# Patient Record
Sex: MALE | Race: WHITE | NOT HISPANIC OR LATINO | Employment: FULL TIME | URBAN - NONMETROPOLITAN AREA
[De-identification: names, ages, dates, MRNs, and addresses within clinical notes are randomized per-mention and may not be internally consistent; named-entity substitution may affect disease eponyms.]

---

## 2017-01-04 DIAGNOSIS — R60.0 LOCALIZED EDEMA: Primary | ICD-10-CM

## 2017-05-23 ENCOUNTER — OFFICE VISIT (OUTPATIENT)
Dept: CARDIAC SURGERY | Facility: CLINIC | Age: 32
End: 2017-05-23

## 2017-05-23 VITALS
HEART RATE: 73 BPM | HEIGHT: 76 IN | OXYGEN SATURATION: 99 % | SYSTOLIC BLOOD PRESSURE: 161 MMHG | DIASTOLIC BLOOD PRESSURE: 94 MMHG | BODY MASS INDEX: 29.83 KG/M2 | WEIGHT: 245 LBS | TEMPERATURE: 98.2 F

## 2017-05-23 DIAGNOSIS — I83.90 VARICOSE VEINS OF LOWER EXTREMITY: Primary | ICD-10-CM

## 2017-05-23 DIAGNOSIS — I87.2 PERIPHERAL VENOUS INSUFFICIENCY: ICD-10-CM

## 2017-05-23 PROCEDURE — 99213 OFFICE O/P EST LOW 20 MIN: CPT | Performed by: NURSE PRACTITIONER

## 2017-05-23 RX ORDER — ESCITALOPRAM OXALATE 10 MG/1
10 TABLET ORAL DAILY
COMMUNITY
End: 2022-02-22

## 2017-06-08 NOTE — PROGRESS NOTES
Subjective   Patient ID: Tenzin Eastman is a 32 y.o. male is here today for follow-up VARICOSE VEINS, INSUFFICIENCY.    History of Present Illness  The following portions of the patient's history were reviewed and updated as appropriate: allergies, current medications, past family history, past medical history, past social history, past surgical history and problem list.  Venous Disease:  Leg swelling, No Leg pain, No dyspnea, No cellulitis, Varicose veins  PCP:  Dr Latrell oCulter    32yr man with chronic varicose veins, thrombophlebitis.  varicosities for many years.   first event thrombophlebitis 5/2015, treated with lovenox, warm moist heat.  slow improvement with moderate discomfort.  work injury 10yrs ago, legs scraped with concrete blocks.  no prior blood clots in legs.  works at [employer], on feet all day.  smokes 1ppd.  no other associated symptoms or modifying factors.    6/5/2015 LEFT venous duplex ([facility]):  no dvt.  thrombosed varicosities in thigh with small patent varicosities lower leg.  8/3/2015 Lower extremity venous:  RIGHT:  no dvt.  marked reflux  CFV, SFV.  marked reflux greater saphenous vein, varicosed and small distal thigh, distal 1.3mm.  LEFT no dvt.  marked reflux CFV, SFV.  marked reflux greater saphenous vein.  thrombosed LEFT distal GSV mid to distal thigh.  11/11/15: BLE Venous duplex: RIGHT: No DVT. marked reflux GSV. LEFT non-occlusive thrombus GSV. marked reflux GSV.  5/11/16: BLE Venous duplex: RIGHT No DVT. Marked reflux GSV/Fem. LEFT non-occlusive thrombus GSV w/ Marked reflux GSV/CFV  5/23/17: BLE Venous Duplex: No DVT. Marked reflux R GSV/FEM/POP. L GSV superficial thrombus. Marked reflux FEM/GSV    Past Medical History:   Diagnosis Date   • Peripheral venous insufficiency     bilateral superficial and deep reflux      • Thrombophlebitis     superficial veins of lower leg - LEFT thigh      • Tobacco dependence syndrome    • Varicose veins of lower extremity     with  ulcer AND inflammation - bilateral      • Varicose veins of unspecified lower extremities with other complications     compression stocking     No past surgical history on file.      Current Outpatient Prescriptions:   •  aspirin 81 MG EC tablet, Take 81 mg by mouth Daily., Disp: , Rfl:   •  escitalopram (LEXAPRO) 10 MG tablet, Take 10 mg by mouth Daily., Disp: , Rfl:     Review of Systems   Constitution: Negative for weakness.   Eyes: Negative for visual disturbance.   Cardiovascular: Negative for claudication and cyanosis.   Respiratory: Negative for shortness of breath.    Skin: Negative for color change and nail changes.   Musculoskeletal: Negative for muscle weakness.   Gastrointestinal: Negative for dysphagia.   Neurological: Negative for focal weakness, light-headedness, loss of balance, numbness and paresthesias.   Psychiatric/Behavioral: Negative for altered mental status.        Objective   Physical Exam   Constitutional: He is oriented to person, place, and time. He appears well-developed.   HENT:   Head: Normocephalic.   Eyes: Pupils are equal, round, and reactive to light.   Neck: Neck supple. Carotid bruit is not present.   Cardiovascular: Normal rate and intact distal pulses.    Pulmonary/Chest: Effort normal and breath sounds normal.   Abdominal: Soft. Bowel sounds are normal.   Musculoskeletal: Normal range of motion. He exhibits no edema.   Neurological: He is alert and oriented to person, place, and time.   Skin: Skin is warm and dry.   Severe varicosities BLE. No inflammation.  No venous staining.   Psychiatric: He has a normal mood and affect. His behavior is normal. Judgment and thought content normal.   Vitals reviewed.          Assessment/Plan   Independent Review of Radiographic Studies:    Detailed discussion regarding risks, benefits, and treatment plan.  Patient understands, agrees, and wishes to proceed with plan.     1. Varicose veins of lower extremity  Reports some aching after  standing long periods. Not currently wearing compression stockings regularly.    Rx given for Jobst stockings. Educated on importance on compression stockings.  Medical Management: ASA 81 mg daily    2. Peripheral venous insufficiency  See #1  - Duplex Venous Lower Extremity - Bilateral CAR; Future            This document has been electronically signed by VANDANA Mackenzie on June 8, 2017 1:47 PM

## 2017-09-21 ENCOUNTER — HOSPITAL ENCOUNTER (EMERGENCY)
Facility: HOSPITAL | Age: 32
Discharge: HOME | End: 2017-09-21
Attending: FAMILY MEDICINE | Admitting: FAMILY MEDICINE

## 2017-09-21 ENCOUNTER — APPOINTMENT (OUTPATIENT)
Dept: ULTRASOUND IMAGING | Facility: HOSPITAL | Age: 32
End: 2017-09-21

## 2017-09-21 VITALS
HEIGHT: 76 IN | SYSTOLIC BLOOD PRESSURE: 141 MMHG | DIASTOLIC BLOOD PRESSURE: 90 MMHG | OXYGEN SATURATION: 99 % | WEIGHT: 245 LBS | TEMPERATURE: 97.6 F | HEART RATE: 89 BPM | BODY MASS INDEX: 29.83 KG/M2 | RESPIRATION RATE: 18 BRPM

## 2017-09-21 DIAGNOSIS — M79.89 PAIN AND SWELLING OF LEFT LOWER LEG: Primary | ICD-10-CM

## 2017-09-21 DIAGNOSIS — M79.662 PAIN AND SWELLING OF LEFT LOWER LEG: Primary | ICD-10-CM

## 2017-09-21 LAB
ALBUMIN SERPL-MCNC: 4 G/DL (ref 3.4–4.8)
ALBUMIN/GLOB SERPL: 1.5 G/DL (ref 1.1–1.8)
ALP SERPL-CCNC: 50 U/L (ref 38–126)
ALT SERPL W P-5'-P-CCNC: 56 U/L (ref 21–72)
ANION GAP SERPL CALCULATED.3IONS-SCNC: 9 MMOL/L (ref 5–15)
AST SERPL-CCNC: 28 U/L (ref 17–59)
BASOPHILS # BLD AUTO: 0.03 10*3/MM3 (ref 0–0.2)
BASOPHILS NFR BLD AUTO: 0.5 % (ref 0–2)
BILIRUB SERPL-MCNC: 0.9 MG/DL (ref 0.2–1.3)
BUN BLD-MCNC: 11 MG/DL (ref 7–21)
BUN/CREAT SERPL: 12.5 (ref 7–25)
CALCIUM SPEC-SCNC: 8.8 MG/DL (ref 8.4–10.2)
CHLORIDE SERPL-SCNC: 103 MMOL/L (ref 95–110)
CO2 SERPL-SCNC: 28 MMOL/L (ref 22–31)
CREAT BLD-MCNC: 0.88 MG/DL (ref 0.7–1.3)
DEPRECATED RDW RBC AUTO: 44.4 FL (ref 35.1–43.9)
EOSINOPHIL # BLD AUTO: 0.15 10*3/MM3 (ref 0–0.7)
EOSINOPHIL NFR BLD AUTO: 2.4 % (ref 0–7)
ERYTHROCYTE [DISTWIDTH] IN BLOOD BY AUTOMATED COUNT: 13.1 % (ref 11.5–14.5)
GFR SERPL CREATININE-BSD FRML MDRD: 100 ML/MIN/1.73 (ref 60–162)
GLOBULIN UR ELPH-MCNC: 2.6 GM/DL (ref 2.3–3.5)
GLUCOSE BLD-MCNC: 79 MG/DL (ref 60–100)
HCT VFR BLD AUTO: 43.3 % (ref 39–49)
HGB BLD-MCNC: 14.4 G/DL (ref 13.7–17.3)
HOLD SPECIMEN: NORMAL
IMM GRANULOCYTES # BLD: 0.03 10*3/MM3 (ref 0–0.02)
IMM GRANULOCYTES NFR BLD: 0.5 % (ref 0–0.5)
LYMPHOCYTES # BLD AUTO: 2.26 10*3/MM3 (ref 0.6–4.2)
LYMPHOCYTES NFR BLD AUTO: 36.9 % (ref 10–50)
MCH RBC QN AUTO: 30.8 PG (ref 26.5–34)
MCHC RBC AUTO-ENTMCNC: 33.3 G/DL (ref 31.5–36.3)
MCV RBC AUTO: 92.5 FL (ref 80–98)
MONOCYTES # BLD AUTO: 0.53 10*3/MM3 (ref 0–0.9)
MONOCYTES NFR BLD AUTO: 8.6 % (ref 0–12)
NEUTROPHILS # BLD AUTO: 3.13 10*3/MM3 (ref 2–8.6)
NEUTROPHILS NFR BLD AUTO: 51.1 % (ref 37–80)
PLATELET # BLD AUTO: 155 10*3/MM3 (ref 150–450)
PMV BLD AUTO: 9 FL (ref 8–12)
POTASSIUM BLD-SCNC: 4.4 MMOL/L (ref 3.5–5.1)
PROT SERPL-MCNC: 6.6 G/DL (ref 6.3–8.6)
RBC # BLD AUTO: 4.68 10*6/MM3 (ref 4.37–5.74)
SODIUM BLD-SCNC: 140 MMOL/L (ref 137–145)
WBC NRBC COR # BLD: 6.13 10*3/MM3 (ref 3.2–9.8)

## 2017-09-21 PROCEDURE — 93971 EXTREMITY STUDY: CPT

## 2017-09-21 PROCEDURE — 80053 COMPREHEN METABOLIC PANEL: CPT | Performed by: PHYSICIAN ASSISTANT

## 2017-09-21 PROCEDURE — 99283 EMERGENCY DEPT VISIT LOW MDM: CPT

## 2017-09-21 PROCEDURE — 85025 COMPLETE CBC W/AUTO DIFF WBC: CPT | Performed by: PHYSICIAN ASSISTANT

## 2017-09-21 RX ORDER — ARIPIPRAZOLE 5 MG/1
5 TABLET ORAL DAILY
COMMUNITY
End: 2022-02-22

## 2017-09-21 RX ORDER — NAPROXEN 500 MG/1
500 TABLET ORAL 2 TIMES DAILY WITH MEALS
Qty: 14 TABLET | Refills: 0 | Status: SHIPPED | OUTPATIENT
Start: 2017-09-21 | End: 2017-09-28

## 2017-09-21 NOTE — ED PROVIDER NOTES
Subjective   HPI Comments: Patient presents to emergency department with LLE pain and swelling x 4 days.  Endorses history of superficial blood clots.  He does take 81mg ASA daily for prevention of clots.  Denies any injury and states symptoms appeared spontaneously.  Denies recent prolonged inactivity, surgical procedures, travel.      Patient is a 32 y.o. male presenting with lower extremity pain.   History provided by:  Patient   used: No    Lower Extremity Issue   Location:  Leg  Time since incident:  4 days  Injury: no    Leg location:  L leg  Pain details:     Quality:  Aching and dull    Radiates to:  Does not radiate    Severity:  Mild    Onset quality:  Sudden    Duration:  4 days    Timing:  Constant    Progression:  Unchanged  Chronicity:  Recurrent  Dislocation: no    Relieved by:  Elevation  Worsened by:  Bearing weight  Associated symptoms: swelling    Associated symptoms: no fever, no numbness and no tingling    Risk factors: obesity        Review of Systems   Unable to perform ROS: Other   Constitutional: Negative for activity change and fever.   Respiratory: Negative for apnea, cough, chest tightness and shortness of breath.    Cardiovascular: Positive for leg swelling. Negative for chest pain.   Musculoskeletal: Negative for joint swelling.   Skin: Positive for color change. Negative for rash and wound.   Neurological: Negative for numbness.       Past Medical History:   Diagnosis Date   • Peripheral venous insufficiency     bilateral superficial and deep reflux      • Thrombophlebitis     superficial veins of lower leg - LEFT thigh      • Tobacco dependence syndrome    • Varicose veins of lower extremity     with ulcer AND inflammation - bilateral      • Varicose veins of unspecified lower extremities with other complications     compression stocking       Allergies   Allergen Reactions   • Ibuprofen    • Penicillins        History reviewed. No pertinent surgical  "history.    Family History   Problem Relation Age of Onset   • Depression Other    • Hypertension Other        Social History     Social History   • Marital status: Single     Spouse name: N/A   • Number of children: N/A   • Years of education: N/A     Social History Main Topics   • Smoking status: Former Smoker   • Smokeless tobacco: Never Used   • Alcohol use 1.8 oz/week     3 Cans of beer per week   • Drug use: No   • Sexual activity: Defer     Other Topics Concern   • None     Social History Narrative   • None           Objective    /97 (BP Location: Left arm, Patient Position: Sitting)  Pulse 62  Temp 97.6 °F (36.4 °C) (Oral)   Resp 18  Ht 76\" (193 cm)  Wt 245 lb (111 kg)  SpO2 97%  BMI 29.82 kg/m2    Physical Exam   Constitutional: He appears well-developed and well-nourished.   Cardiovascular: Normal rate, regular rhythm, normal heart sounds and intact distal pulses.    Pulmonary/Chest: Effort normal and breath sounds normal.   Musculoskeletal:        Left knee: He exhibits swelling (mild) and erythema.        Legs:  Skin: Skin is warm and dry. There is erythema.   Psychiatric: He has a normal mood and affect.   Nursing note and vitals reviewed.      Procedures         ED Course  ED Course     Results for orders placed or performed during the hospital encounter of 09/21/17   Comprehensive Metabolic Panel   Result Value Ref Range    Glucose 79 60 - 100 mg/dL    BUN 11 7 - 21 mg/dL    Creatinine 0.88 0.70 - 1.30 mg/dL    Sodium 140 137 - 145 mmol/L    Potassium 4.4 3.5 - 5.1 mmol/L    Chloride 103 95 - 110 mmol/L    CO2 28.0 22.0 - 31.0 mmol/L    Calcium 8.8 8.4 - 10.2 mg/dL    Total Protein 6.6 6.3 - 8.6 g/dL    Albumin 4.00 3.40 - 4.80 g/dL    ALT (SGPT) 56 21 - 72 U/L    AST (SGOT) 28 17 - 59 U/L    Alkaline Phosphatase 50 38 - 126 U/L    Total Bilirubin 0.9 0.2 - 1.3 mg/dL    eGFR Non African Amer 100 >60 mL/min/1.73    Globulin 2.6 2.3 - 3.5 gm/dL    A/G Ratio 1.5 1.1 - 1.8 g/dL    " BUN/Creatinine Ratio 12.5 7.0 - 25.0    Anion Gap 9.0 5.0 - 15.0 mmol/L   CBC Auto Differential   Result Value Ref Range    WBC 6.13 3.20 - 9.80 10*3/mm3    RBC 4.68 4.37 - 5.74 10*6/mm3    Hemoglobin 14.4 13.7 - 17.3 g/dL    Hematocrit 43.3 39.0 - 49.0 %    MCV 92.5 80.0 - 98.0 fL    MCH 30.8 26.5 - 34.0 pg    MCHC 33.3 31.5 - 36.3 g/dL    RDW 13.1 11.5 - 14.5 %    RDW-SD 44.4 (H) 35.1 - 43.9 fl    MPV 9.0 8.0 - 12.0 fL    Platelets 155 150 - 450 10*3/mm3    Neutrophil % 51.1 37.0 - 80.0 %    Lymphocyte % 36.9 10.0 - 50.0 %    Monocyte % 8.6 0.0 - 12.0 %    Eosinophil % 2.4 0.0 - 7.0 %    Basophil % 0.5 0.0 - 2.0 %    Immature Grans % 0.5 0.0 - 0.5 %    Neutrophils, Absolute 3.13 2.00 - 8.60 10*3/mm3    Lymphocytes, Absolute 2.26 0.60 - 4.20 10*3/mm3    Monocytes, Absolute 0.53 0.00 - 0.90 10*3/mm3    Eosinophils, Absolute 0.15 0.00 - 0.70 10*3/mm3    Basophils, Absolute 0.03 0.00 - 0.20 10*3/mm3    Immature Grans, Absolute 0.03 (H) 0.00 - 0.02 10*3/mm3   Us Venous Doppler Lower Extremity Left (duplex)    Result Date: 9/21/2017  Narrative: Procedure: Left lower extremity venous ultrasound CLINICAL INDICATION: erythema and swelling x 4 days, hx of superficial clots COMPARISON: None FINDINGS: The common femoral,  femoral,  popliteal and calf veins of the left  lower extremity are well identified and compress normally. Doppler signals are heard either spontaneously or on distal compression.  No evidence of intraluminal thrombus was noted.     Impression: CONCLUSION:  No evidence of deep venous thrombosis in the common femoral, femoral, popliteal and calf veins of the left  lower extremity. Electronically signed by:  Juan Alberto Clifford MD  9/21/2017 11:18 AM CDT Workstation: [id number]          MDM    Final diagnoses:   Pain and swelling of left lower leg            Trey Pritchard PA-C  09/21/17 0906       Trey Pritchard PA-C  09/21/17 0921       Trey Pritchard PA-C  09/21/17 1132       Trey Pritchard,  TENISHA  09/21/17 1142

## 2021-03-02 ENCOUNTER — OFFICE VISIT CONVERTED (OUTPATIENT)
Dept: FAMILY MEDICINE CLINIC | Facility: CLINIC | Age: 36
End: 2021-03-02
Attending: FAMILY MEDICINE

## 2021-03-18 ENCOUNTER — TELEMEDICINE CONVERTED (OUTPATIENT)
Dept: FAMILY MEDICINE CLINIC | Facility: CLINIC | Age: 36
End: 2021-03-18
Attending: FAMILY MEDICINE

## 2021-03-18 ENCOUNTER — CONVERSION ENCOUNTER (OUTPATIENT)
Dept: FAMILY MEDICINE CLINIC | Facility: CLINIC | Age: 36
End: 2021-03-18

## 2021-05-10 NOTE — H&P
History and Physical      Patient Name: Tenzin Eastman   Patient ID: [id number]   Sex: Male   YOB: 1985        Visit Date: March 2, 2021    Provider: Urbano Cabrera DO   Location: [facility]   Location Address: [address] [place] [address]  [id number]   Location Phone: [phone]          Chief Complaint  · Establish care   · Moved here a year ago from [place].   · Blood pressure usually runs high, hasn't been on medication for this.   · Has had to have several tests for superficial blood clots in the past.      History Of Present Illness  Tenzin Eastman is a 35 year old male who presents for evaluation and treatment of:        Presents to establish care, from [place] originally and recently moved here to  his new gf/santa who is from here, met her online. 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 Social drinker, no heavy drinking only on weekends, no illicit use     Has child from former relationship, that child lives with her  His santa has 2 teen children who he's marrying later this year    He says his father used to be  for many years     He has a few tattoos, one recently from right arm of [place] Nordic jesika. He states his sister did dna ancestry found some Nordic history so that's why got the [place] tattoo    He has PMH of superficial thrombitis on left lower ext or knee a few years ago flares intermittently, no problems today, a lot of walking at work helps to reduce flares. No SI/HI, chest pain, HA, cough congestion, short of breath, pain, fever         Past Medical History  Disease Name Date Onset Notes   Hx of anxiety disorder --  --    Overweight (BMI 25.0-29.9) --  --    Superficial thrombophlebitis --  hx of left knee/lower ext w/ above flares intermittently         Past Surgical History  Procedure  "Name Date Notes   *Denies any surgical procedures --  --          Allergy List  Allergen Name Date Reaction Notes   ibuprofen --  --  only happened once, was on 800mg    PENICILLINS --  --  --        Allergies Reconciled  Social History  Finding Status Start/Stop Quantity Notes    --  --/-- --  child lives with other parent, santa has 2 teenage children marrying later this year 2021   Denies alcohol consumption --  --/-- --  --    Denies substance abuse --  --/-- --  --    Engaged --  --/-- --  Anthony from here, goes to our clinic, [facility], from [place] originally 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 moved here to  santa he met her online in the past year and to be  this 2021   Former smoker --  --/-- --  10-12 yr 1/2 ppd if that, quit 2019 cold turkey   Working --  --/-- --  [employer], [employer] lots of walking         Review of Systems  · Constitutional  o Denies  o : fever, fatigue, weight loss, weight gain  · HENT  o Denies  o : sinus pain, nasal congestion, nasal discharge, postnasal drip  · Cardiovascular  o Denies  o : lower extremity edema, claudication, chest pressure, palpitations  · Respiratory  o Denies  o : shortness of breath, wheezing, cough, hemoptysis, dyspnea on exertion  · Gastrointestinal  o Denies  o : nausea, vomiting, diarrhea, constipation, abdominal pain  · Integument  o Denies  o : rash, itching  · Neurologic  o Denies  o : altered mental status, muscular weakness, incoordination  · Musculoskeletal  o Denies  o : joint pain, joint swelling, muscle pain  · Psychiatric  o Denies  o : anxiety, depression      Vitals  Date Time BP Position Site L\R Cuff Size HR RR TEMP (F) WT  HT  BMI kg/m2 BSA m2 O2 Sat FR L/min FiO2 HC       03/02/2021 10:37 /93 Sitting    77 - R 16 97.6 233lbs 2oz 6'  4\" 28.38 2.38 96 %  21%          Physical Examination  · Constitutional  o Appearance  o : no acute distress, well-nourished  · Head and Face  o Head  o : "   § Inspection  § : atraumatic, normocephalic  o Face  o :   § Inspection  § : no facial lesions  · Ears, Nose, Mouth and Throat  o Ears  o :   § External Ears  § : normal  o Nose  o :   § Intranasal Exam  § : nares patent  o Oral Cavity  o :   § Oral Mucosa  § : moist mucous membranes  · Neck  o Thyroid  o : gland size normal, nontender, no nodules or masses present on palpation, symmetric  · Respiratory  o Respiratory Effort  o : breathing comfortably, symmetric chest rise  o Auscultation of Lungs  o : clear to asculatation bilaterally, no wheezes, rales, or rhonchii  · Cardiovascular  o Heart  o :   § Auscultation of Heart  § : regular rate and rhythm, no murmurs, rubs, or gallops  o Peripheral Vascular System  o :   § Extremities  § : no edema  · Lymphatic  o Neck  o : no lymphadenopathy present  · Skin and Subcutaneous Tissue  o General Inspection  o : no lesions present, no areas of discoloration, skin turgor normal  · Neurologic  o Mental Status Examination  o :   § Orientation  § : grossly oriented to person, place and time  o Gait and Station  o :   § Gait Screening  § : normal gait  · Psychiatric  o General  o : normal mood and affect              Assessment  · Screening for depression     V79.0/Z13.89  · Superficial thrombophlebitis     451.9/I80.9  hx of left knee/lower ext w/ above flares intermittently  · Overweight (BMI 25.0-29.9)     278.02/E66.3  · Hx of anxiety disorder     V11.8/Z86.59           Follow-up annually for physical recommend labs before follow-up plan continue with treatment and monitoring of his left lower extremity thrombophlebitis or other that flares up, advised to follow-up when this does happen or call     Problems Reconciled  Plan  · Orders  o ACO-18: Negative screen for clinical depression using a standardized tool () - V79.0/Z13.89 - 03/02/2021  o Physical, Primary Care Panel (CBC, CMP, Lipid, TSH) [facility] (97702, 51325, 14130, 16848) - V79.0/Z13.89, 451.9/I80.9,  278.02/E66.3, V11.8/Z86.59 - 03/02/2021  o CRYSTAL Report (KASPR) - V79.0/Z13.89, 451.9/I80.9, 278.02/E66.3, V11.8/Z86.59 - 03/02/2021  o ACO-39: Current medications updated and reviewed (, 1159F) - V79.0/Z13.89, 451.9/I80.9, 278.02/E66.3, V11.8/Z86.59 - 03/02/2021  · Medications  o Medications have been Reconciled  o Transition of Care or Provider Policy  · Instructions  o Depression Screen completed and scanned into the EMR under the designated folder within the patient's documents.  o Today's PHQ-9 result is _1__  o Handouts were given to patient:   o Patient is taking medications as prescribed and doing well.   o Take all medications as prescribed/directed.  o Patient was educated/instructed on their diagnosis, treatment and medications prior to discharge from the clinic today.  o Trusted Web sites were provided.  o Call the office with any concerns or questions.  o Bring all medicines with their bottles to each office visit.  o Risks, benefits, and alternatives were discussed with the patient. The patient is aware of risks associated with:  o Chronic conditions reviewed and taken into consideration for today's treatment plan.  o Electronically Identified Patient Education Materials Provided Electronically  · Disposition  o Call or Return if symptoms worsen or persist.  o Labs before follow up ordered  o Follow up when due for next physical  o Return Visit Request in/on 6 months +/- 7 days ([id number]).            Electronically Signed by: Urbano Cabrera, DO -Author on March 7, 2021 03:41:04 PM

## 2021-05-14 VITALS
SYSTOLIC BLOOD PRESSURE: 131 MMHG | RESPIRATION RATE: 16 BRPM | WEIGHT: 233.12 LBS | TEMPERATURE: 97.6 F | BODY MASS INDEX: 28.39 KG/M2 | DIASTOLIC BLOOD PRESSURE: 93 MMHG | OXYGEN SATURATION: 96 % | HEART RATE: 77 BPM | HEIGHT: 76 IN

## 2021-05-14 VITALS — WEIGHT: 233 LBS | BODY MASS INDEX: 28.37 KG/M2 | HEIGHT: 76 IN

## 2021-05-14 NOTE — PROGRESS NOTES
"   Progress Note      Patient Name: Tenzin Eastman   Patient ID: [id number]   Sex: Male   YOB: 1985        Visit Date: March 18, 2021    Provider: Urbano Cabrera DO   Location: [facility]   Location Address: [address] [place] [address]  [id number]   Location Phone: [phone]          Chief Complaint  · c/o not feeling well      History Of Present Illness  Video Conferencing Visit  Tenzin Eastman is a 35 year old male who is presenting for evaluation via video conferencing via [employer]. Verbal consent obtained before beginning visit.   The following staff were present during this visit: Urbano Cabrera DO   Tenzin Eastman is a 35 year old male who presents for evaluation and treatment of:        Patient presents complaining of low-grade fever cough congestion not been feeling well for the last few days or so was seen couple weeks ago to establish care.  No sick contacts no loss of smell or taste       Review of Systems  · Constitutional  o Denies  o : fever, fatigue, weight loss, weight gain  · HENT  o Admits  o : nasal congestion, postnasal drip  o Denies  o : sinus pain, nasal discharge  · Cardiovascular  o Denies  o : lower extremity edema, claudication, chest pressure, palpitations  · Respiratory  o Admits  o : cough  o Denies  o : shortness of breath, wheezing, hemoptysis, dyspnea on exertion  · Gastrointestinal  o Denies  o : nausea, vomiting, diarrhea, constipation, abdominal pain  · Integument  o Denies  o : rash, itching  · Endocrine  o Denies  o : polyuria, polydipsia  · Psychiatric  o Denies  o : anxiety, depression      Vitals  Date Time BP Position Site L\R Cuff Size HR RR TEMP (F) WT  HT  BMI kg/m2 BSA m2 O2 Sat FR L/min FiO2        03/18/2021 02:12 PM         232lbs 16oz 6'  4\" 28.36 2.38             Physical Examination  · Constitutional  o Appearance  o : no acute distress, well-nourished  · Head and Face  o Head  o :   § Inspection  § : atraumatic, " normocephalic  · Eyes  o Eyes  o : extraocular movements intact, no scleral icterus, no conjunctival injection  · Ears, Nose, Mouth and Throat  o Ears  o :   § External Ears  § : normal  o Nose  o :   § Intranasal Exam  § : nares patent  o Oral Cavity  o :   § Oral Mucosa  § : moist mucous membranes  · Neurologic  o Mental Status Examination  o :   § Orientation  § : grossly oriented to person, place and time  o Gait and Station  o :   § Gait Screening  § : normal gait  · Psychiatric  o General  o : normal mood and affect              Assessment  · Allergic rhinitis due to allergen     477.9/J30.9         back to work  continue Claritin   no concerns  f/u if worse       Plan  · Orders  o ACO-39: Current medications updated and reviewed (, 1159F) - 477.9/J30.9 - 03/18/2021  · Medications  o Medications have been Reconciled  · Instructions  o Patient is taking medications as prescribed and doing well.   o Take all medications as prescribed/directed.  o Patient was educated/instructed on their diagnosis, treatment and medications prior to discharge from the clinic today.  o Risks, benefits, and alternatives were discussed with the patient. The patient is aware of risks associated with:  o Chronic conditions reviewed and taken into consideration for today's treatment plan.  o Electronically Identified Patient Education Materials Provided Electronically  · Disposition  o Call or Return if symptoms worsen or persist.  o as scheduled            Electronically Signed by: Urbano Cabrera DO -Author on March 18, 2021 03:06:35 PM

## 2022-02-22 ENCOUNTER — OFFICE VISIT (OUTPATIENT)
Dept: FAMILY MEDICINE CLINIC | Facility: CLINIC | Age: 37
End: 2022-02-22

## 2022-02-22 VITALS
RESPIRATION RATE: 18 BRPM | OXYGEN SATURATION: 99 % | HEIGHT: 76 IN | BODY MASS INDEX: 26.56 KG/M2 | HEART RATE: 68 BPM | SYSTOLIC BLOOD PRESSURE: 127 MMHG | WEIGHT: 218.1 LBS | DIASTOLIC BLOOD PRESSURE: 81 MMHG

## 2022-02-22 DIAGNOSIS — J06.9 VIRAL UPPER RESPIRATORY TRACT INFECTION: Primary | ICD-10-CM

## 2022-02-22 PROBLEM — Z11.59 NEED FOR HEPATITIS C SCREENING TEST: Status: ACTIVE | Noted: 2022-02-22

## 2022-02-22 LAB
EXPIRATION DATE: NORMAL
INTERNAL CONTROL: NORMAL
Lab: NORMAL
SARS-COV-2 AG UPPER RESP QL IA.RAPID: NOT DETECTED

## 2022-02-22 PROCEDURE — 87426 SARSCOV CORONAVIRUS AG IA: CPT | Performed by: FAMILY MEDICINE

## 2022-02-22 PROCEDURE — 99213 OFFICE O/P EST LOW 20 MIN: CPT | Performed by: FAMILY MEDICINE

## 2022-02-22 RX ORDER — AZITHROMYCIN 250 MG/1
TABLET, FILM COATED ORAL
Qty: 6 TABLET | Refills: 0 | Status: SHIPPED | OUTPATIENT
Start: 2022-02-22 | End: 2022-07-11

## 2022-07-11 ENCOUNTER — HOSPITAL ENCOUNTER (OUTPATIENT)
Dept: GENERAL RADIOLOGY | Facility: HOSPITAL | Age: 37
Discharge: HOME | End: 2022-07-11
Admitting: FAMILY MEDICINE

## 2022-07-11 ENCOUNTER — OFFICE VISIT (OUTPATIENT)
Dept: FAMILY MEDICINE CLINIC | Facility: CLINIC | Age: 37
End: 2022-07-11

## 2022-07-11 VITALS
HEIGHT: 76 IN | OXYGEN SATURATION: 98 % | DIASTOLIC BLOOD PRESSURE: 85 MMHG | SYSTOLIC BLOOD PRESSURE: 139 MMHG | HEART RATE: 68 BPM | WEIGHT: 201.8 LBS | BODY MASS INDEX: 24.57 KG/M2 | RESPIRATION RATE: 18 BRPM | TEMPERATURE: 97.6 F

## 2022-07-11 DIAGNOSIS — S46.911A ELBOW STRAIN, RIGHT, INITIAL ENCOUNTER: Primary | ICD-10-CM

## 2022-07-11 DIAGNOSIS — S46.911A ELBOW STRAIN, RIGHT, INITIAL ENCOUNTER: ICD-10-CM

## 2022-07-11 DIAGNOSIS — S46.311A STRAIN OF RIGHT TRICEPS TENDON, INITIAL ENCOUNTER: ICD-10-CM

## 2022-07-11 PROBLEM — I80.9 SUPERFICIAL THROMBOPHLEBITIS: Status: ACTIVE | Noted: 2022-07-11

## 2022-07-11 PROBLEM — E66.3 OVERWEIGHT (BMI 25.0-29.9): Status: ACTIVE | Noted: 2022-07-11

## 2022-07-11 PROBLEM — Z86.59 HX OF ANXIETY DISORDER: Status: ACTIVE | Noted: 2022-07-11

## 2022-07-11 PROCEDURE — 99213 OFFICE O/P EST LOW 20 MIN: CPT | Performed by: FAMILY MEDICINE

## 2022-07-11 PROCEDURE — 73080 X-RAY EXAM OF ELBOW: CPT

## 2022-07-11 NOTE — PROGRESS NOTES
"Chief Complaint  Elbow Injury (Right elbow injury from weight lifting yesterday.)    Subjective        Tenzin Eastman presents to [facility]  History of Present Illness    The patient presents for evaluation of right elbow pain. He injured his right elbow while working out. He takes aspirin. His blood pressure is 139/85 mmHg, and he has no prior problems with hypertension or other. His BMI is 24.6, and previously, it was 26.6. He has had a weight loss of 17 pounds since 02/2022.     Currently, the patient states that he injured his right elbow yesterday while lifting weights in an overhead motion when he felt a \"pop.\" He localizes his pain to the posterior aspect of his right elbow, and he states that it is aggravated with extension. The patient states that he is able to bear weight. He reports a history of past muscle strains, and he is hopeful that this is also just a strain.       Objective   Vital Signs:  /85 (BP Location: Left arm, Patient Position: Sitting)   Pulse 68   Temp 97.6 °F (36.4 °C) (Infrared)   Resp 18   Ht 193 cm (76\")   Wt 91.5 kg (201 lb 12.8 oz)   SpO2 98%   BMI 24.56 kg/m²   Estimated body mass index is 24.56 kg/m² as calculated from the following:    Height as of this encounter: 193 cm (76\").    Weight as of this encounter: 91.5 kg (201 lb 12.8 oz).    BMI is within normal parameters. No other follow-up for BMI required.      Physical Exam  Vitals reviewed.   Constitutional:       Appearance: Normal appearance. He is well-developed.   HENT:      Head: Normocephalic and atraumatic.      Right Ear: External ear normal.      Left Ear: External ear normal.      Nose: Nose normal.   Eyes:      Conjunctiva/sclera: Conjunctivae normal.      Pupils: Pupils are equal, round, and reactive to light.   Cardiovascular:      Rate and Rhythm: Normal rate.   Pulmonary:      Effort: Pulmonary effort is normal.      Breath sounds: Normal breath sounds. " "  Abdominal:      General: There is no distension.   Musculoskeletal:      Left upper arm: Normal.      Right elbow: Swelling present. Decreased range of motion. Tenderness present.   Skin:     General: Skin is warm and dry.   Neurological:      General: No focal deficit present.      Mental Status: He is alert and oriented to person, place, and time.   Psychiatric:         Mood and Affect: Mood and affect normal.         Behavior: Behavior normal.         Thought Content: Thought content normal.         Judgment: Judgment normal.        Result Review :  The following data was reviewed by: rUbano Cabrera DO on 07/11/2022:                Assessment and Plan   Diagnoses and all orders for this visit:    1. Elbow strain, right, initial encounter (Primary)  -     XR Elbow 3+ View Right; Future    2. Strain of right triceps tendon, initial encounter      1. Right elbow pain  - The patient has a possible tendon strain. I ordered x-rays of the right elbow to further evaluate for a possible elbow strain versus rupture and to evaluate for any avulsion or damage. We will consider ultrasound or MRI imaging if there are worsening signs or symptoms. He does have a 30 degree loss of full extension of the right elbow accompanied by some weakness. He felt a \"pop\" while performing certain weighted triceps exercises yesterday. The patient will be on lifting restrictions at work for the next 2 weeks, and I advised against lifting for the next 1 to 2 weeks. He was given at-home range-of-motion exercises, and he will consider following up with a specialist as well as physical therapy as appropriate. The patient has an allergy to anti-inflammatories; therefore, he can try topical or other pain medication as needed. I recommended alternating ice and heat to manage pain.        Follow Up   Return in about 2 weeks (around 7/25/2022), or if symptoms worsen or fail to improve, for Recheck.  Patient was given instructions and counseling " regarding his condition or for health maintenance advice. Please see specific information pulled into the AVS if appropriate.     Transcribed from ambient dictation for Urbano Cabrera DO by CESAR ODONNELL.  07/11/22   18:30 EDT    Patient verbalized consent to the visit recording.

## 2022-07-12 NOTE — PROGRESS NOTES
Note spurring in elbow with small fragment and bursitis, will refer to orthopedics to make sure not a tear or other, will call patient to alert

## 2022-07-14 ENCOUNTER — OFFICE VISIT (OUTPATIENT)
Dept: FAMILY MEDICINE CLINIC | Facility: CLINIC | Age: 37
End: 2022-07-14

## 2022-07-14 VITALS
BODY MASS INDEX: 23.87 KG/M2 | HEIGHT: 76 IN | WEIGHT: 196 LBS | SYSTOLIC BLOOD PRESSURE: 139 MMHG | OXYGEN SATURATION: 98 % | TEMPERATURE: 97.6 F | DIASTOLIC BLOOD PRESSURE: 91 MMHG | RESPIRATION RATE: 20 BRPM | HEART RATE: 70 BPM

## 2022-07-14 DIAGNOSIS — F41.8 DEPRESSION WITH ANXIETY: Primary | ICD-10-CM

## 2022-07-14 PROCEDURE — 99213 OFFICE O/P EST LOW 20 MIN: CPT | Performed by: FAMILY MEDICINE

## 2022-07-14 RX ORDER — ESCITALOPRAM OXALATE 10 MG/1
10 TABLET ORAL DAILY
Qty: 30 TABLET | Refills: 2 | Status: SHIPPED | OUTPATIENT
Start: 2022-07-14 | End: 2022-08-11

## 2022-07-14 NOTE — PROGRESS NOTES
"Chief Complaint  Depression (Increased depression in the last two week.) and Anxiety (Ongoing for years.)    Subjective          Tenzin Eastman presents to [facility]  History of Present Illness     The patient is here to follow-up on depression and to discuss his anxiety.    The patient reports that his elbow has improved and his range of motion is not completely normal, but has improved since the last time he was seen.     For the past couple of weeks the patient has noticed symptoms he associates with clinical depression. The patient was diagnosed with depression in the past and did see a therapist. He did take Lexapro in the past. He also had success with Wellbutrin. Today he is interested in a referral for a physiological specialist.     Objective   Vital Signs:   /91   Pulse 70   Temp 97.6 °F (36.4 °C)   Resp 20   Ht 193 cm (76\")   Wt 88.9 kg (196 lb)   SpO2 98%   BMI 23.86 kg/m²     BMI is within normal parameters. No other follow-up for BMI required.      Physical Exam  Vitals reviewed.   Constitutional:       Appearance: Normal appearance. He is well-developed.   HENT:      Head: Normocephalic and atraumatic.      Right Ear: External ear normal.      Left Ear: External ear normal.      Nose: Nose normal.   Eyes:      Conjunctiva/sclera: Conjunctivae normal.      Pupils: Pupils are equal, round, and reactive to light.   Cardiovascular:      Rate and Rhythm: Normal rate.   Pulmonary:      Effort: Pulmonary effort is normal.      Breath sounds: Normal breath sounds.   Abdominal:      General: There is no distension.   Skin:     General: Skin is warm and dry.   Neurological:      General: No focal deficit present.      Mental Status: He is alert and oriented to person, place, and time.   Psychiatric:         Mood and Affect: Mood and affect normal.         Behavior: Behavior normal.         Thought Content: Thought content normal.         Judgment: Judgment " normal.          Result Review :   The following data was reviewed by: Urbano Cabrera DO on 07/14/2022:      Data reviewed: Radiologic studies ELBOW XRAY              Assessment and Plan    Diagnoses and all orders for this visit:    1. Depression with anxiety (Primary)  -     escitalopram (Lexapro) 10 MG tablet; Take 1 tablet by mouth Daily.  Dispense: 30 tablet; Refill: 2  -     Ambulatory Referral to Psychiatry    Depression  - We will start the patient on Lexapro and refer him to psychiatry for further medication management.     Elbow  The patient will continue mild range-of-motion exercises for his triceps. He will follow-up with orthopedics as referred.       Follow Up   No follow-ups on file.  Patient was given instructions and counseling regarding his condition or for health maintenance advice. Please see specific information pulled into the AVS if appropriate.     Transcribed from ambient dictation for Urbano Cabrera DO by BIANKA THAO.  07/14/22   19:50 EDT    Patient verbalized consent to the visit recording.

## 2022-07-27 ENCOUNTER — OFFICE VISIT (OUTPATIENT)
Dept: ORTHOPEDIC SURGERY | Facility: CLINIC | Age: 37
End: 2022-07-27

## 2022-07-27 VITALS — HEIGHT: 76 IN | OXYGEN SATURATION: 96 % | HEART RATE: 67 BPM | WEIGHT: 196 LBS | BODY MASS INDEX: 23.87 KG/M2

## 2022-07-27 DIAGNOSIS — M24.021 LOOSE BODY IN ELBOW JOINT, RIGHT: Primary | ICD-10-CM

## 2022-07-27 PROCEDURE — 99204 OFFICE O/P NEW MOD 45 MIN: CPT | Performed by: STUDENT IN AN ORGANIZED HEALTH CARE EDUCATION/TRAINING PROGRAM

## 2022-07-27 NOTE — PROGRESS NOTES
"Chief Complaint  Pain and Follow-up of the Right Elbow    Subjective          Tenzin Eastman presents to [facility] for   History of Present Illness    The patient presents here today for evaluation of the right elbow. The patient reports pain to the elbow after feeling a pop when working out. He has pain with certain activities. He is left hand dominate. 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 He has no other complaints.     Allergies   Allergen Reactions   • Ibuprofen    • Penicillins         Social History     Socioeconomic History   • Marital status:    Tobacco Use   • Smoking status: Former Smoker     Packs/day: 1.00     Years: 2.00     Pack years: 2.00     Start date: 2000     Quit date: 2020     Years since quittin.5   • Smokeless tobacco: Never Used   Vaping Use   • Vaping Use: Never used   Substance and Sexual Activity   • Alcohol use: Yes     Alcohol/week: 3.0 standard drinks     Types: 3 Cans of beer per week   • Drug use: No   • Sexual activity: Defer        I reviewed the patient's chief complaint, history of present illness, review of systems, past medical history, surgical history, family history, social history, medications, and allergy list.     REVIEW OF SYSTEMS    Constitutional: Denies fevers, chills, weight loss  Cardiovascular: Denies chest pain, shortness of breath  Skin: Denies rashes, acute skin changes  Neurologic: Denies headache, loss of consciousness  MSK: Right elbow pain      Objective   Vital Signs:   Pulse 67   Ht 193 cm (76\")   Wt 88.9 kg (196 lb)   SpO2 96%   BMI 23.86 kg/m²     Body mass index is 23.86 kg/m².    Physical Exam    General: Alert. No acute distress.   Right elbow- non-tender to the olecranon, distal triceps. ROM -10 to 130 degrees. Stable to varus/valgus stress. 90 pronation and supination crepitus with motion. 5/5 resisted elbow extension. Stable to varus/valgus stress. Sensation intact to " light touch median, radial, ulnar nerve. Positive AIN, PIN, ulnar nerve motor function. Positive pulses.     Procedures    Imaging Results (Most Recent)     None                   Assessment and Plan        XR Elbow 3+ View Right    Result Date: 7/11/2022  Narrative: PROCEDURE: XR ELBOW 3+ VW RIGHT  COMPARISON: None  INDICATIONS: RIGHT POSTERIOR ELBOW PAIN  FINDINGS:  There is no evidence of an acute fracture.  There is a small fragment of bone posterior to the olecranon at the level of the joint which may be a loose body.  There is spurring from the far posterior likely non with a small adjacent fragment possibly from bursitis.  There is no acute fracture.  There is no joint effusion.      Impression:  Suspected posterior joint loose body and olecranon spurring.  No acute osseous abnormality.      REZA JASON MD       Electronically Signed and Approved By: REZA JASON MD on 7/11/2022 at 16:41                Diagnoses and all orders for this visit:    1. Loose body in elbow joint, right (Primary)        Discussed the treatment options with the patient, operative vs non-operative. Plan for MRI of the right elbow to evaluate the loose body. The patient expressed understanding and wished to proceed.        Call or return if worsening symptoms.    Scribed for Real Atwood MD by Phyllis Rodriguez  07/27/2022   12:59 EDT         Follow Up   Return for MRI results.  Patient was given instructions and counseling regarding his condition or for health maintenance advice. Please see specific information pulled into the AVS if appropriate.       I have personally performed the services described in this document as scribed by the above individual and it is both accurate and complete.     Real Atwood MD  07/27/22  13:05 EDT

## 2022-08-11 ENCOUNTER — OFFICE VISIT (OUTPATIENT)
Dept: PSYCHIATRY | Facility: CLINIC | Age: 37
End: 2022-08-11

## 2022-08-11 VITALS
HEIGHT: 76 IN | DIASTOLIC BLOOD PRESSURE: 87 MMHG | SYSTOLIC BLOOD PRESSURE: 143 MMHG | WEIGHT: 206 LBS | BODY MASS INDEX: 25.09 KG/M2

## 2022-08-11 DIAGNOSIS — F41.1 GENERALIZED ANXIETY DISORDER: Primary | ICD-10-CM

## 2022-08-11 DIAGNOSIS — F33.1 MODERATE EPISODE OF RECURRENT MAJOR DEPRESSIVE DISORDER: ICD-10-CM

## 2022-08-11 PROCEDURE — 90792 PSYCH DIAG EVAL W/MED SRVCS: CPT | Performed by: NURSE PRACTITIONER

## 2022-08-11 RX ORDER — ESCITALOPRAM OXALATE 10 MG/1
10 TABLET ORAL DAILY
Qty: 30 TABLET | Refills: 2 | Status: SHIPPED | OUTPATIENT
Start: 2022-08-11 | End: 2022-10-24 | Stop reason: SDUPTHER

## 2022-08-11 NOTE — PROGRESS NOTES
"Subjective   Tenzin Eastman is a 37 y.o. male who presents today for initial evaluation   This provider is located at [address] [place] [address], [address] in [place], [place]. In-person visit consisting of the patient and I only. The patient is accepting of and agreeable to this appointment.     Chief Complaint:  Depression, anxiety    History of Present Illness: Depression/anxiety: Onset around 5197-0733.  Has had increase in symptoms over the past 3 to 4 months, with no specific triggers, stating \"'s life.  \" Sleeps okay.  Energy good.  Appetite good.  Difficulty concentrating.  Low motivation and interest.  Feelings of worthlessness and hopelessness at times.  Denies suicidal thoughts or homicidal thoughts.  Anxiety is high, with excessive worries.  Patient states \" again to my own head.  Sometimes gets worse and sometimes is okay.  \" Feels overwhelmed.  Difficult to manage anxiety at times.  Endorses physical symptoms of anxiety, including feeling tense and shaky.    Psychiatric Review of Systems: Patient denies any current or previous hallucinations/delusions, paranoia, manic symptoms or PTSD.     PHQ-9 Depression Screening  PHQ-9 Total Score:      Little interest or pleasure in doing things?     Feeling down, depressed, or hopeless?     Trouble falling or staying asleep, or sleeping too much?     Feeling tired or having little energy?     Poor appetite or overeating?     Feeling bad about yourself - or that you are a failure or have let yourself or your family down?     Trouble concentrating on things, such as reading the newspaper or watching television?     Moving or speaking so slowly that other people could have noticed? Or the opposite - being so fidgety or restless that you have been moving around a lot more than usual?     Thoughts that you would be better off dead, or of hurting yourself in some way?     PHQ-9 Total Score       FAYE-7  Feeling nervous, anxious or on edge: Several days  Not being " able to stop or control worrying: More than half the days  Worrying too much about different things: More than half the days  Trouble Relaxing: Several days  Being so restless that it is hard to sit still: Not at all  Feeling afraid as if something awful might happen: Several days  Becoming easily annoyed or irritable: More than half the days  FAYE 7 Total Score: 9  If you checked any problems, how difficult have these problems made it for you to do your work, take care of things at home, or get along with other people: Somewhat difficult      Past Surgical History:  History reviewed. No pertinent surgical history.    Problem List:  Patient Active Problem List   Diagnosis   • Varicose veins of lower extremity   • Peripheral venous insufficiency   • Need for hepatitis C screening test   • Viral upper respiratory tract infection   • Hx of anxiety disorder   • Overweight (BMI 25.0-29.9)   • Superficial thrombophlebitis   • Loose body in elbow joint, right       Allergy:   Allergies   Allergen Reactions   • Ibuprofen    • Penicillins         Discontinued Medications:  Medications Discontinued During This Encounter   Medication Reason   • escitalopram (Lexapro) 10 MG tablet *Therapy completed       Current Medications:   Current Outpatient Medications   Medication Sig Dispense Refill   • aspirin 81 MG EC tablet Take 81 mg by mouth Daily.     • escitalopram (Lexapro) 10 MG tablet Take 1 tablet by mouth Daily. 30 tablet 2     No current facility-administered medications for this visit.       Past Medical History:  Past Medical History:   Diagnosis Date   • Anxiety    • Depression    • Peripheral venous insufficiency     bilateral superficial and deep reflux      • Thrombophlebitis     superficial veins of lower leg - LEFT thigh      • Tobacco dependence syndrome    • Varicose veins of lower extremity     with ulcer AND inflammation - bilateral      • Varicose veins of unspecified lower extremities with other complications   "   compression stocking       Past Psychiatric History:  Began Treatment: 2018   Diagnoses: Depression, anxiety  Psychiatrist: Previously in [place], [place]  Therapist: Previously in [place], [place]  Admission History: Denies  Medication Trials: Lexapro, wellbutrin  Self Harm: Denies  Suicide Attempts: Denies    Substance Abuse History:   Types: Denies  Withdrawal Symptoms: n/a  Longest Period Sober: n/a  AA: n/a    Social History:  Martial Status:   Employed: [employer]  Kids: Three  House: Lives with wife and children   History: Denies    Social History     Socioeconomic History   • Marital status:    Tobacco Use   • Smoking status: Former Smoker     Packs/day: 1.00     Years: 2.00     Pack years: 2.00     Start date: 2000     Quit date: 2020     Years since quittin.6   • Smokeless tobacco: Never Used   Vaping Use   • Vaping Use: Every day   • Substances: THC   • Devices: Pre-filled or refillable cartridge, Pre-filled pod   Substance and Sexual Activity   • Alcohol use: Not Currently     Alcohol/week: 3.0 standard drinks     Types: 3 Cans of beer per week   • Drug use: Yes     Types: Marijuana   • Sexual activity: Yes       Family History:   Suicide Attempts: Denies  Suicide Completions: Denies      Family History   Problem Relation Age of Onset   • Depression Mother    • No Known Problems Father    • Dementia Maternal Grandmother    • Depression Other    • Hypertension Other        Developmental History:   Born: [place]  Siblings:Two sisters  Childhood: Denies childhood abuse  High School: Graduate  College: Denies    Access to Firearms: Denies    Mental Status Exam:     Appearance: good eye contact, normal street clothes, groomed, sitting in chair   Behavior: pleasant and cooperative  Motor: no abnormal  Speech: normal rhythm, rate, volume, tone, not hyperverbal, not pressured, normal prosidy  Mood: \" Okay \"  Affect: depressed  Thought Content: negative suicidal ideations, negative " "homicidal ideations, negative obsessions  Perceptions: negative auditory hallucinations, negative visual hallucinations, negative delusions, negative paranoia  Thought Process: goal directed, linear  Insight/Judgement: fair/fair  Cognition: grossly intact  Attention: intact  Orientation: person, place, time and situation  Memory: intact    Review of Systems:     Constitutional: Denies fatigue, night sweats  Eyes: Denies double vision, blurred vision  HENT: Denies vertigo, recent head injury  Cardiovascular: Denies chest pain, irregular heartbeats  Respiratory: Denies productive cough, shortness of breath  Gastrointestinal: Denies nausea, vomiting  Genitourinary: Denies dysuria, urinary retention  Integument: Denies hair growth change, new skin lesions  Neurologic: Denies altered mental status, seizures  Musculoskeletal: Denies joint swelling, limitation of motion  Endocrine: Denies cold intolerance, heat intolerance  Psychiatric: Admits anxiety, depression. Denies graciela, post-traumatic stress disorder, obsessive compulsive disorder, psychosis.   Allergic-immunologic: Denies frequent illnesses      Vital Signs:   /87   Ht 193 cm (76\")   Wt 93.4 kg (206 lb)   BMI 25.08 kg/m²      Lab Results:   Office Visit on 02/22/2022   Component Date Value Ref Range Status   • SARS Antigen 02/22/2022 Not Detected  Not Detected Final   • Internal Control 02/22/2022 Passed  Passed Final   • Lot Number 02/22/2022 [id number]   Final   • Expiration Date 02/22/2022 [id number]   Final       EKG Results:  No orders to display       Imaging Results:  XR Elbow 3+ View Right    Result Date: 7/11/2022   Suspected posterior joint loose body and olecranon spurring.  No acute osseous abnormality.      REZA JASON MD       Electronically Signed and Approved By: REZA JASON MD on 7/11/2022 at 16:41                 Assessment & Plan   Diagnoses and all orders for this visit:    1. Generalized anxiety disorder (Primary)    2. Moderate " episode of recurrent major depressive disorder (HCC)  -     escitalopram (Lexapro) 10 MG tablet; Take 1 tablet by mouth Daily.  Dispense: 30 tablet; Refill: 2      Patient presents with a history of depression and anxiety.  Was previously on Lexapro with benefit to depression anxiety.  We will start back on Lexapro target depression and anxiety. 21 minutes of supportive psychotherapy with goal to strengthen defenses, promote problems solving, restore adaptive functioning and provide symptom relief. The therapeutic alliance was strengthened to encourage the patient to express their thoughts and feelings. Esteem building was enhanced through praise, reassurance, normalizing and encouragement. Coping skills were enhanced to build distress tolerance skills and emotional regulation. Patient given education on medication side effects, diagnosis/illness and relapse symptoms. Plan to continue supportive psychotherapy in next appointment to provide symptom relief. 4 weeks    Visit Diagnoses:    ICD-10-CM ICD-9-CM   1. Generalized anxiety disorder  F41.1 300.02   2. Moderate episode of recurrent major depressive disorder (HCC)  F33.1 296.32       PLAN:  1. Safety: No acute safety concerns.   2. Therapy: Declines  3. Risk Assessment: Risk of self-harm acutely is moderate.  Risk factors include anxiety disorder, mood disorder, and recent psychosocial stressors (pandemic). Protective factors include no family history, denies access to guns/weapons, no present SI, no history of suicide attempts or self-harm in the past, minimal AODA, healthcare seeking, future orientation, willingness to engage in care.  Risk of self-harm chronically is also moderate, but could be further elevated in the event of treatment noncompliance and/or AODA.  4. Medications: Start escitalopram 10 mg p.o. daily to target depression and anxiety. Risks, benefits, alternatives discussed with patient including GI upset, nausea vomiting diarrhea, theoretical  decrease of seizure threshold predisposing the patient to a slightly higher seizure risk, headaches, sexual dysfunction, serotonin syndrome, bleeding risk, increased suicidality in patients 24 years and younger.  After discussion of these risks and benefits, the patient voiced understanding and agreed to proceed.  5. Labs/studies: No labs/studies ordered at this time  6. Follow-up: 1 month    Patient screened positive for depression based on a PHQ-9 score of 11 on 8/11/2022. Follow-up recommendations include: Suicide Risk Assessment performed.         TREATMENT PLAN/GOALS: Continue supportive psychotherapy efforts and medications as indicated. Treatment and medication options discussed during today's visit. Patient ackowledged and verbally consented to continue with current treatment plan and was educated on the importance of compliance with treatment and follow-up appointments.      MEDICATION ISSUES:  CRYSTAL reviewed as expected.  Discussed medication options and treatment plan of prescribed medication as well as the risks, benefits, and side effects including potential falls, possible impaired driving and metabolic adversities among others. Patient is agreeable to call the office with any worsening of symptoms or onset of side effects. Patient is agreeable to call 911 or go to the nearest ER should he/she begin having SI/HI. No medication side effects or related complaints today.     MEDS ORDERED DURING VISIT:  New Medications Ordered This Visit   Medications   • escitalopram (Lexapro) 10 MG tablet     Sig: Take 1 tablet by mouth Daily.     Dispense:  30 tablet     Refill:  2       Return in about 4 weeks (around 9/8/2022) for Next scheduled follow up.         This document has been electronically signed by VANDANA Chu  August 11, 2022 08:55 EDT      Part of this note may be an electronic transcription/translation of spoken language to printed text using the Dragon Dictation System.

## 2022-08-11 NOTE — TREATMENT PLAN
Multi-Disciplinary Problems (from Behavioral Health Treatment Plan)    Active Problems     Problem: Depression  Start Date: 08/11/22    Problem Details: The patient self-scales this problem as a 5 with 10 being the worst.        Goal Priority Start Date Expected End Date End Date    Patient will demonstrate the ability to initiate new constructive life skills outside of sessions on a consistent basis. -- 08/11/22 -- --    Goal Details: Progress toward goal:  Not appropriate to rate progress toward goal since this is the initial treatment plan.        Goal Intervention Frequency Start Date End Date    Assist patient in setting attainable activities of daily living goals. PRN 08/11/22 --    Goal Intervention Frequency Start Date End Date    Provide education about depression Weekly 08/11/22 --    Intervention Details: Duration of treatment until until remission of symptoms.        Goal Intervention Frequency Start Date End Date    Assist patient in developing healthy coping strategies. Weekly 08/11/22 --    Intervention Details: Duration of treatment until until remission of symptoms.                    Reviewed By     Shoaib Jacques APRN 08/11/22 0849                 I have discussed and reviewed this treatment plan with the patient.

## 2022-08-22 ENCOUNTER — HOSPITAL ENCOUNTER (OUTPATIENT)
Dept: MRI IMAGING | Facility: HOSPITAL | Age: 37
Discharge: HOME | End: 2022-08-22
Admitting: STUDENT IN AN ORGANIZED HEALTH CARE EDUCATION/TRAINING PROGRAM

## 2022-08-22 DIAGNOSIS — M24.021 LOOSE BODY IN ELBOW JOINT, RIGHT: ICD-10-CM

## 2022-08-22 PROCEDURE — 73221 MRI JOINT UPR EXTREM W/O DYE: CPT

## 2022-08-26 ENCOUNTER — OFFICE VISIT (OUTPATIENT)
Dept: ORTHOPEDIC SURGERY | Facility: CLINIC | Age: 37
End: 2022-08-26

## 2022-08-26 VITALS — HEART RATE: 59 BPM | OXYGEN SATURATION: 97 % | BODY MASS INDEX: 24.84 KG/M2 | HEIGHT: 76 IN | WEIGHT: 204 LBS

## 2022-08-26 DIAGNOSIS — M24.021 LOOSE BODY IN ELBOW JOINT, RIGHT: Primary | ICD-10-CM

## 2022-08-26 PROCEDURE — 99213 OFFICE O/P EST LOW 20 MIN: CPT | Performed by: STUDENT IN AN ORGANIZED HEALTH CARE EDUCATION/TRAINING PROGRAM

## 2022-08-26 NOTE — PROGRESS NOTES
"Chief Complaint  Follow-up and Pain of the Right Elbow    Subjective          Tenzin Eastman presents to [facility] for   History of Present Illness    Tenzin returns for follow-up of his right elbow after obtaining an MRI.  To review, he has right elbow pain and felt a popping sensation after working out.  He has pain with activities.  He feels his pain limits his ability to work and lift which has been difficult for him.  He denies any numbness.  He denies any new symptoms since his previous evaluation.    Allergies   Allergen Reactions   • Ibuprofen    • Penicillins         Social History     Socioeconomic History   • Marital status:    Tobacco Use   • Smoking status: Former Smoker     Packs/day: 1.00     Years: 2.00     Pack years: 2.00     Start date: 2000     Quit date: 2020     Years since quittin.6   • Smokeless tobacco: Never Used   Vaping Use   • Vaping Use: Every day   • Substances: THC   • Devices: Pre-filled or refillable cartridge, Pre-filled pod   Substance and Sexual Activity   • Alcohol use: Not Currently     Alcohol/week: 3.0 standard drinks     Types: 3 Cans of beer per week   • Drug use: Yes     Types: Marijuana   • Sexual activity: Yes        I reviewed the patient's chief complaint, history of present illness, review of systems, past medical history, surgical history, family history, social history, medications, and allergy list.     REVIEW OF SYSTEMS    Constitutional: Denies fevers, chills, weight loss  Cardiovascular: Denies chest pain, shortness of breath  Skin: Denies rashes, acute skin changes  Neurologic: Denies headache, loss of consciousness  MSK: Right elbow pain      Objective   Vital Signs:   Pulse 59   Ht 193 cm (76\")   Wt 92.5 kg (204 lb)   SpO2 97%   BMI 24.83 kg/m²     Body mass index is 24.83 kg/m².    Physical Exam    General: Alert. No acute distress.   Right elbow- non-tender to the olecranon, distal triceps. ROM -10 " to 130 degrees. Stable to varus/valgus stress. 90 pronation and supination crepitus with motion. 5/5 resisted elbow extension. Stable to varus/valgus stress. Sensation intact to light touch median, radial, ulnar nerve. Positive AIN, PIN, ulnar nerve motor function. Positive pulses.     Procedures    Imaging Results (Most Recent)     None                   Assessment and Plan        MRI Elbow Right Without Contrast    Result Date: 8/22/2022  Narrative: PROCEDURE: MRI ELBOW RIGHT WO CONTRAST  COMPARISON: [facility] DORIS WETZEL, CR, XR ELBOW 3+ VW RIGHT, 7/11/2022, 16:51.  INDICATIONS: RIGHT ELBOW PAIN WHEN PUSHING OFF WITH HAND. RIGHT ELBOW POPPED AFTER LIFTING WEIGHTS. ELBOW SWELLING.  TECHNIQUE: A variety of imaging planes and parameters were utilized for visualization of suspected pathology.  Images were performed without contrast. FINDINGS:  The examination is moderately limited by patient motion artifact.  Along the posterolateral aspect of the joint is a 0.9 cm os ossific density favored to represent a loose body.  A donor site is not visualized.  Evaluation for marrow edema is limited by poor fat saturation.  The common extensor and common flexor tendons appear intact.  The biceps and brachialis tendons appear intact as does the triceps tendon.  The ulnar and radial collateral ligaments appear intact.  A moderate elbow joint effusion is noted.  Cartilage along the radial head appears markedly thinned.  The cubital tunnel appears grossly unremarkable.  Mild enthesopathic changes are noted at the common extensor tendon attachment to the lateral humeral epicondyle      Impression:   1. Examination limited by patient motion artifact 2. 0.9 cm loose body in the posterolateral joint 3. Moderate elbow joint effusion 4. Mild enthesopathy at the common extensor origin from the lateral humeral epicondyle      Pancho Duran M.D.       Electronically Signed and Approved By: Pancho Duran M.D. on 8/22/2022 at 16:48                 Diagnoses and all orders for this visit:    1. Loose body in elbow joint, right (Primary)  -     Ambulatory Referral to Orthopedic Surgery        We reviewed his MRI results today in the office.  He does have a loose body along the posterior elbow.  We discussed additional treatment.  We discussed both operative and nonoperative measures.  He is interested in loose body removal.  We will refer him to an upper extremity specialist for further evaluation.  He may contact my office as needed going forward.      Call or return if worsening symptoms.    Scribed for Real Atwood MD by Donna Arcos  08/26/2022   11:24 EDT         Follow Up   Return if symptoms worsen or fail to improve.  Patient was given instructions and counseling regarding his condition or for health maintenance advice. Please see specific information pulled into the AVS if appropriate.       I have personally performed the services described in this document as scribed by the above individual and it is both accurate and complete.     Real Atwood MD  08/26/22  11:32 EDT

## 2022-09-01 ENCOUNTER — OFFICE VISIT (OUTPATIENT)
Dept: PSYCHIATRY | Facility: CLINIC | Age: 37
End: 2022-09-01

## 2022-09-01 VITALS
SYSTOLIC BLOOD PRESSURE: 129 MMHG | BODY MASS INDEX: 24.96 KG/M2 | DIASTOLIC BLOOD PRESSURE: 87 MMHG | HEIGHT: 76 IN | WEIGHT: 205 LBS

## 2022-09-01 DIAGNOSIS — F33.1 MODERATE EPISODE OF RECURRENT MAJOR DEPRESSIVE DISORDER: Primary | ICD-10-CM

## 2022-09-01 DIAGNOSIS — F41.1 GENERALIZED ANXIETY DISORDER: ICD-10-CM

## 2022-09-01 PROCEDURE — 90833 PSYTX W PT W E/M 30 MIN: CPT | Performed by: NURSE PRACTITIONER

## 2022-09-01 PROCEDURE — 99214 OFFICE O/P EST MOD 30 MIN: CPT | Performed by: NURSE PRACTITIONER

## 2022-09-01 NOTE — PROGRESS NOTES
"Subjective   Tenzin Eastman is a 37 y.o. male who presents today for follow up.   This provider is located at [address] [place] [address], [address] in [place], [place]. In-person visit consisting of the patient and I only. The patient is accepting of and agreeable to this appointment.     Chief Complaint:  Depression, anxiety    History of Present Illness:     Depression over the past month- has improved- does have mood swings at times- \"They go away faster. Anger management is better.\"   Sleep- good  Interest- Denies anhedonia  Guilt- Denies  Energy- good  Concentration- denies  Appetite- better  Psychomotor retardation/agitation- Denies  Suicidal thoughts or hopelessness- denies hopelessness, si or hi.     Anxiety over the past month- has been high   Anxious, nervous or worried on most days about a number of events or activities- less worries  No control over worries- able to manage better- working on positive coping skills  Irritability- denies  Concentration- see above  Sleep- see above  Restlessness- denies  Tension in muscles- endorses    Psychiatric Review of Systems: Patient denies any current or previous hallucinations/delusions, paranoia, manic symptoms or PTSD.     PHQ-9 Depression Screening  PHQ-9 Total Score:      Little interest or pleasure in doing things?     Feeling down, depressed, or hopeless?     Trouble falling or staying asleep, or sleeping too much?     Feeling tired or having little energy?     Poor appetite or overeating?     Feeling bad about yourself - or that you are a failure or have let yourself or your family down?     Trouble concentrating on things, such as reading the newspaper or watching television?     Moving or speaking so slowly that other people could have noticed? Or the opposite - being so fidgety or restless that you have been moving around a lot more than usual?     Thoughts that you would be better off dead, or of hurting yourself in some way?     PHQ-9 Total Score   "     FAYE-7  Feeling nervous, anxious or on edge: Several days  Not being able to stop or control worrying: Not at all  Worrying too much about different things: Not at all  Trouble Relaxing: Several days  Being so restless that it is hard to sit still: Not at all  Feeling afraid as if something awful might happen: Not at all  Becoming easily annoyed or irritable: Not at all  FAYE 7 Total Score: 2  If you checked any problems, how difficult have these problems made it for you to do your work, take care of things at home, or get along with other people: Not difficult at all      Past Surgical History:  History reviewed. No pertinent surgical history.    Problem List:  Patient Active Problem List   Diagnosis   • Varicose veins of lower extremity   • Peripheral venous insufficiency   • Need for hepatitis C screening test   • Viral upper respiratory tract infection   • Hx of anxiety disorder   • Overweight (BMI 25.0-29.9)   • Superficial thrombophlebitis   • Loose body in elbow joint, right       Allergy:   Allergies   Allergen Reactions   • Ibuprofen    • Penicillins         Discontinued Medications:  There are no discontinued medications.    Current Medications:   Current Outpatient Medications   Medication Sig Dispense Refill   • aspirin 81 MG EC tablet Take 81 mg by mouth Daily.     • escitalopram (Lexapro) 10 MG tablet Take 1 tablet by mouth Daily. 30 tablet 2     No current facility-administered medications for this visit.       Past Medical History:  Past Medical History:   Diagnosis Date   • Anxiety    • Depression    • Peripheral venous insufficiency     bilateral superficial and deep reflux      • Thrombophlebitis     superficial veins of lower leg - LEFT thigh      • Tobacco dependence syndrome    • Varicose veins of lower extremity     with ulcer AND inflammation - bilateral      • Varicose veins of unspecified lower extremities with other complications     compression stocking       Past Psychiatric  "History:  Began Treatment: 2018   Diagnoses: Depression, anxiety  Psychiatrist: Previously in [place], [place]  Therapist: Previously in [place], [place]  Admission History: Denies  Medication Trials: Lexapro, wellbutrin  Self Harm: Denies  Suicide Attempts: Denies    Substance Abuse History:   Types: Denies  Withdrawal Symptoms: n/a  Longest Period Sober: n/a  AA: n/a    Social History:  Martial Status:   Employed: [employer]  Kids: Three  House: Lives with wife and children   History: Denies    Social History     Socioeconomic History   • Marital status:    Tobacco Use   • Smoking status: Former Smoker     Packs/day: 1.00     Years: 2.00     Pack years: 2.00     Start date: 2000     Quit date: 2020     Years since quittin.6   • Smokeless tobacco: Never Used   Vaping Use   • Vaping Use: Every day   • Substances: THC   • Devices: Pre-filled or refillable cartridge, Pre-filled pod   Substance and Sexual Activity   • Alcohol use: Not Currently     Alcohol/week: 3.0 standard drinks     Types: 3 Cans of beer per week   • Drug use: Yes     Types: Marijuana   • Sexual activity: Yes       Family History:   Suicide Attempts: Denies  Suicide Completions: Denies      Family History   Problem Relation Age of Onset   • Depression Mother    • No Known Problems Father    • Dementia Maternal Grandmother    • Depression Other    • Hypertension Other        Developmental History:   Born: [place]  Siblings:Two sisters  Childhood: Denies childhood abuse  High School: Graduate  College: Denies    Access to Firearms: Denies    Mental Status Exam:     Appearance: good eye contact, normal street clothes, groomed, sitting in chair   Behavior: pleasant and cooperative  Motor: no abnormal  Speech: normal rhythm, rate, volume, tone, not hyperverbal, not pressured, normal prosidy  Mood: \" Better \"  Affect: euthymic  Thought Content: negative suicidal ideations, negative homicidal ideations, negative " "obsessions  Perceptions: negative auditory hallucinations, negative visual hallucinations, negative delusions, negative paranoia  Thought Process: goal directed, linear  Insight/Judgement: fair/fair  Cognition: grossly intact  Attention: intact  Orientation: person, place, time and situation  Memory: intact    Review of Systems:     Constitutional: Denies fatigue, night sweats  Eyes: Denies double vision, blurred vision  HENT: Denies vertigo, recent head injury  Cardiovascular: Denies chest pain, irregular heartbeats  Respiratory: Denies productive cough, shortness of breath  Gastrointestinal: Denies nausea, vomiting  Genitourinary: Denies dysuria, urinary retention  Integument: Denies hair growth change, new skin lesions  Neurologic: Denies altered mental status, seizures  Musculoskeletal: Denies joint swelling, limitation of motion  Endocrine: Denies cold intolerance, heat intolerance  Psychiatric: Admits anxiety, depression. Denies graciela, post-traumatic stress disorder, obsessive compulsive disorder, psychosis.   Allergic-immunologic: Denies frequent illnesses      Vital Signs:   /87   Ht 193 cm (76\")   Wt 93 kg (205 lb)   BMI 24.95 kg/m²      Lab Results:   Office Visit on 02/22/2022   Component Date Value Ref Range Status   • SARS Antigen 02/22/2022 Not Detected  Not Detected Final   • Internal Control 02/22/2022 Passed  Passed Final   • Lot Number 02/22/2022 [id number]   Final   • Expiration Date 02/22/2022 [id number]   Final       EKG Results:  No orders to display       Imaging Results:  XR Elbow 3+ View Right    Result Date: 7/11/2022   Suspected posterior joint loose body and olecranon spurring.  No acute osseous abnormality.      REZA JASON MD       Electronically Signed and Approved By: REZA JASON MD on 7/11/2022 at 16:41                 Assessment & Plan   Diagnoses and all orders for this visit:    1. Moderate episode of recurrent major depressive disorder (HCC) (Primary)    2. Generalized " anxiety disorder      Continue Lexapro with benefit to depression anxiety. 16 minutes of supportive psychotherapy with goal to strengthen defenses, promote problems solving, restore adaptive functioning and provide symptom relief. The therapeutic alliance was strengthened to encourage the patient to express their thoughts and feelings. Esteem building was enhanced through praise, reassurance, normalizing and encouragement. Coping skills were enhanced to build distress tolerance skills and emotional regulation. Patient given education on medication side effects, diagnosis/illness and relapse symptoms. Plan to continue supportive psychotherapy in next appointment to provide symptom relief. 4 weeks    Visit Diagnoses:    ICD-10-CM ICD-9-CM   1. Moderate episode of recurrent major depressive disorder (HCC)  F33.1 296.32   2. Generalized anxiety disorder  F41.1 300.02       PLAN:  1. Safety: No acute safety concerns.   2. Therapy: Declines  3. Risk Assessment: Risk of self-harm acutely is moderate.  Risk factors include anxiety disorder, mood disorder, and recent psychosocial stressors (pandemic). Protective factors include no family history, denies access to guns/weapons, no present SI, no history of suicide attempts or self-harm in the past, minimal AODA, healthcare seeking, future orientation, willingness to engage in care.  Risk of self-harm chronically is also moderate, but could be further elevated in the event of treatment noncompliance and/or AODA.  4. Medications: Continue escitalopram 10 mg p.o. daily to target depression and anxiety. Risks, benefits, alternatives discussed with patient including GI upset, nausea vomiting diarrhea, theoretical decrease of seizure threshold predisposing the patient to a slightly higher seizure risk, headaches, sexual dysfunction, serotonin syndrome, bleeding risk, increased suicidality in patients 24 years and younger.  After discussion of these risks and benefits, the patient  voiced understanding and agreed to proceed.  5. Labs/studies: No labs/studies ordered at this time  6. Follow-up: 1 month    Patient screened positive for depression based on a PHQ-9 score of 2 on 9/1/2022. Follow-up recommendations include: Suicide Risk Assessment performed.         TREATMENT PLAN/GOALS: Continue supportive psychotherapy efforts and medications as indicated. Treatment and medication options discussed during today's visit. Patient ackowledged and verbally consented to continue with current treatment plan and was educated on the importance of compliance with treatment and follow-up appointments.      MEDICATION ISSUES:  CRYSTAL reviewed as expected.  Discussed medication options and treatment plan of prescribed medication as well as the risks, benefits, and side effects including potential falls, possible impaired driving and metabolic adversities among others. Patient is agreeable to call the office with any worsening of symptoms or onset of side effects. Patient is agreeable to call 911 or go to the nearest ER should he/she begin having SI/HI. No medication side effects or related complaints today.     MEDS ORDERED DURING VISIT:  No orders of the defined types were placed in this encounter.      Return in about 4 weeks (around 9/29/2022) for Next scheduled follow up.         This document has been electronically signed by VANDANA Chu  September 1, 2022 09:20 EDT      Part of this note may be an electronic transcription/translation of spoken language to printed text using the Dragon Dictation System.

## 2022-09-01 NOTE — TREATMENT PLAN
Multi-Disciplinary Problems (from Behavioral Health Treatment Plan)    Active Problems     Problem: Depression  Start Date: 08/11/22    Problem Details: The patient self-scales this problem as a 5 with 10 being the worst.        Goal Priority Start Date Expected End Date End Date    Patient will demonstrate the ability to initiate new constructive life skills outside of sessions on a consistent basis. -- 08/11/22 -- --    Goal Details: Progress toward goal:  The patient self-scales this problem as a 3 with 10 being the worst.        Goal Intervention Frequency Start Date End Date    Assist patient in setting attainable activities of daily living goals. PRN 08/11/22 --    Goal Intervention Frequency Start Date End Date    Provide education about depression Weekly 08/11/22 --    Intervention Details: Duration of treatment until until remission of symptoms.        Goal Intervention Frequency Start Date End Date    Assist patient in developing healthy coping strategies. Weekly 08/11/22 --    Intervention Details: Duration of treatment until until remission of symptoms.                    Reviewed By     Shoaib Jacques APRN 09/01/22 0907                 I have discussed and reviewed this treatment plan with the patient.

## 2022-09-30 ENCOUNTER — OFFICE VISIT (OUTPATIENT)
Dept: PSYCHIATRY | Facility: CLINIC | Age: 37
End: 2022-09-30

## 2022-09-30 VITALS
HEIGHT: 76 IN | WEIGHT: 208 LBS | SYSTOLIC BLOOD PRESSURE: 143 MMHG | BODY MASS INDEX: 25.33 KG/M2 | DIASTOLIC BLOOD PRESSURE: 88 MMHG

## 2022-09-30 DIAGNOSIS — F33.1 MODERATE EPISODE OF RECURRENT MAJOR DEPRESSIVE DISORDER: Primary | ICD-10-CM

## 2022-09-30 DIAGNOSIS — F41.1 GENERALIZED ANXIETY DISORDER: ICD-10-CM

## 2022-09-30 PROCEDURE — 99214 OFFICE O/P EST MOD 30 MIN: CPT | Performed by: NURSE PRACTITIONER

## 2022-09-30 PROCEDURE — 90833 PSYTX W PT W E/M 30 MIN: CPT | Performed by: NURSE PRACTITIONER

## 2022-09-30 NOTE — PROGRESS NOTES
Subjective   Tenzin Eastman is a 37 y.o. male who presents today for follow up.   This provider is located at [address] [place] [address], [address] in [place], [place]. In-person visit consisting of the patient and I only. The patient is accepting of and agreeable to this appointment.     Chief Complaint:  Depression, anxiety    History of Present Illness:     Depression over the past month- has had increase in stress- Father's health  Sleep- good  Interest- Denies anhedonia  Guilt- Denies  Energy- good  Concentration- denies  Appetite- good  Psychomotor retardation/agitation- Denies  Suicidal thoughts or hopelessness- denies hopelessness, si or hi.     Anxiety over the past month- has been high   Anxious, nervous or worried on most days about a number of events or activities- less worries  No control over worries- able to manage better- working on positive coping skills  Irritability- denies  Concentration- see above  Sleep- see above  Restlessness- denies  Tension in muscles- endorses    Psychiatric Review of Systems: Patient denies any current or previous hallucinations/delusions, paranoia, manic symptoms or PTSD.     PHQ-9 Depression Screening  PHQ-9 Total Score:      Little interest or pleasure in doing things?     Feeling down, depressed, or hopeless?     Trouble falling or staying asleep, or sleeping too much?     Feeling tired or having little energy?     Poor appetite or overeating?     Feeling bad about yourself - or that you are a failure or have let yourself or your family down?     Trouble concentrating on things, such as reading the newspaper or watching television?     Moving or speaking so slowly that other people could have noticed? Or the opposite - being so fidgety or restless that you have been moving around a lot more than usual?     Thoughts that you would be better off dead, or of hurting yourself in some way?     PHQ-9 Total Score       FAYE-7         Past Surgical History:  History  reviewed. No pertinent surgical history.    Problem List:  Patient Active Problem List   Diagnosis   • Varicose veins of lower extremity   • Peripheral venous insufficiency   • Need for hepatitis C screening test   • Viral upper respiratory tract infection   • Hx of anxiety disorder   • Overweight (BMI 25.0-29.9)   • Superficial thrombophlebitis   • Loose body in elbow joint, right       Allergy:   Allergies   Allergen Reactions   • Ibuprofen    • Penicillins         Discontinued Medications:  Medications Discontinued During This Encounter   Medication Reason   • aspirin 81 MG EC tablet Historical Med - Therapy completed       Current Medications:   Current Outpatient Medications   Medication Sig Dispense Refill   • escitalopram (Lexapro) 10 MG tablet Take 1 tablet by mouth Daily. 30 tablet 2     No current facility-administered medications for this visit.       Past Medical History:  Past Medical History:   Diagnosis Date   • Anxiety    • Depression    • Peripheral venous insufficiency     bilateral superficial and deep reflux      • Thrombophlebitis     superficial veins of lower leg - LEFT thigh      • Tobacco dependence syndrome    • Varicose veins of lower extremity     with ulcer AND inflammation - bilateral      • Varicose veins of unspecified lower extremities with other complications     compression stocking       Past Psychiatric History:  Began Treatment: 2018   Diagnoses: Depression, anxiety  Psychiatrist: Previously in [place], [place]  Therapist: Previously in [place], [place]  Admission History: Denies  Medication Trials: Lexapro, wellbutrin  Self Harm: Denies  Suicide Attempts: Denies    Substance Abuse History:   Types: Denies  Withdrawal Symptoms: n/a  Longest Period Sober: n/a  AA: n/a    Social History:  Martial Status:   Employed: [employer]  Kids: Three  House: Lives with wife and children   History: Denies    Social History     Socioeconomic History   • Marital status:   "  Tobacco Use   • Smoking status: Former Smoker     Packs/day: 1.00     Years: 2.00     Pack years: 2.00     Start date: 2000     Quit date: 2020     Years since quittin.7   • Smokeless tobacco: Never Used   Vaping Use   • Vaping Use: Every day   • Substances: THC   • Devices: Pre-filled or refillable cartridge, Pre-filled pod   Substance and Sexual Activity   • Alcohol use: Not Currently     Alcohol/week: 3.0 standard drinks     Types: 3 Cans of beer per week   • Drug use: Yes     Types: Marijuana   • Sexual activity: Yes       Family History:   Suicide Attempts: Denies  Suicide Completions: Denies      Family History   Problem Relation Age of Onset   • Depression Mother    • No Known Problems Father    • Dementia Maternal Grandmother    • Depression Other    • Hypertension Other        Developmental History:   Born: [place]  Siblings:Two sisters  Childhood: Denies childhood abuse  High School: Graduate  College: Denies    Access to Firearms: Denies    Mental Status Exam:     Appearance: good eye contact, normal street clothes, groomed, sitting in chair   Behavior: pleasant and cooperative  Motor: no abnormal  Speech: normal rhythm, rate, volume, tone, not hyperverbal, not pressured, normal prosidy  Mood: \" Good \"  Affect: euthymic  Thought Content: negative suicidal ideations, negative homicidal ideations, negative obsessions  Perceptions: negative auditory hallucinations, negative visual hallucinations, negative delusions, negative paranoia  Thought Process: goal directed, linear  Insight/Judgement: fair/fair  Cognition: grossly intact  Attention: intact  Orientation: person, place, time and situation  Memory: intact    Review of Systems:     Constitutional: Denies fatigue, night sweats  Eyes: Denies double vision, blurred vision  HENT: Denies vertigo, recent head injury  Cardiovascular: Denies chest pain, irregular heartbeats  Respiratory: Denies productive cough, shortness of breath  Gastrointestinal: " "Denies nausea, vomiting  Genitourinary: Denies dysuria, urinary retention  Integument: Denies hair growth change, new skin lesions  Neurologic: Denies altered mental status, seizures  Musculoskeletal: Denies joint swelling, limitation of motion  Endocrine: Denies cold intolerance, heat intolerance  Psychiatric: Admits anxiety, depression. Denies graciela, post-traumatic stress disorder, obsessive compulsive disorder, psychosis.   Allergic-immunologic: Denies frequent illnesses      Vital Signs:   /88   Ht 193 cm (76\")   Wt 94.3 kg (208 lb)   BMI 25.32 kg/m²      Lab Results:   Office Visit on 02/22/2022   Component Date Value Ref Range Status   • SARS Antigen 02/22/2022 Not Detected  Not Detected Final   • Internal Control 02/22/2022 Passed  Passed Final   • Lot Number 02/22/2022 [id number]   Final   • Expiration Date 02/22/2022 [id number]   Final       EKG Results:  No orders to display       Imaging Results:  XR Elbow 3+ View Right    Result Date: 7/11/2022   Suspected posterior joint loose body and olecranon spurring.  No acute osseous abnormality.      REZA JASON MD       Electronically Signed and Approved By: REZA JASON MD on 7/11/2022 at 16:41                 Assessment & Plan   Diagnoses and all orders for this visit:    1. Moderate episode of recurrent major depressive disorder (HCC) (Primary)    2. Generalized anxiety disorder      Continue Lexapro with benefit to depression and anxiety. 16 minutes of supportive psychotherapy with goal to strengthen defenses, promote problems solving, restore adaptive functioning and provide symptom relief. The therapeutic alliance was strengthened to encourage the patient to express their thoughts and feelings. Esteem building was enhanced through praise, reassurance, normalizing and encouragement. Coping skills were enhanced to build distress tolerance skills and emotional regulation. Patient given education on medication side effects, diagnosis/illness and relapse " symptoms. Plan to continue supportive psychotherapy in next appointment to provide symptom relief. 4 weeks    Visit Diagnoses:    ICD-10-CM ICD-9-CM   1. Moderate episode of recurrent major depressive disorder (HCC)  F33.1 296.32   2. Generalized anxiety disorder  F41.1 300.02       PLAN:  1. Safety: No acute safety concerns.   2. Therapy: Declines  3. Risk Assessment: Risk of self-harm acutely is moderate.  Risk factors include anxiety disorder, mood disorder, and recent psychosocial stressors (pandemic). Protective factors include no family history, denies access to guns/weapons, no present SI, no history of suicide attempts or self-harm in the past, minimal AODA, healthcare seeking, future orientation, willingness to engage in care.  Risk of self-harm chronically is also moderate, but could be further elevated in the event of treatment noncompliance and/or AODA.  4. Medications: Continue escitalopram 10 mg p.o. daily to target depression and anxiety. Risks, benefits, alternatives discussed with patient including GI upset, nausea vomiting diarrhea, theoretical decrease of seizure threshold predisposing the patient to a slightly higher seizure risk, headaches, sexual dysfunction, serotonin syndrome, bleeding risk, increased suicidality in patients 24 years and younger.  After discussion of these risks and benefits, the patient voiced understanding and agreed to proceed.  5. Labs/studies: No labs/studies ordered at this time  6. Follow-up: 1 month    Patient screened positive for depression based on a PHQ-9 score of 2 on 9/1/2022. Follow-up recommendations include: Suicide Risk Assessment performed.         TREATMENT PLAN/GOALS: Continue supportive psychotherapy efforts and medications as indicated. Treatment and medication options discussed during today's visit. Patient ackowledged and verbally consented to continue with current treatment plan and was educated on the importance of compliance with treatment and  follow-up appointments.      MEDICATION ISSUES:  CRYSTAL reviewed as expected.  Discussed medication options and treatment plan of prescribed medication as well as the risks, benefits, and side effects including potential falls, possible impaired driving and metabolic adversities among others. Patient is agreeable to call the office with any worsening of symptoms or onset of side effects. Patient is agreeable to call 911 or go to the nearest ER should he/she begin having SI/HI. No medication side effects or related complaints today.     MEDS ORDERED DURING VISIT:  No orders of the defined types were placed in this encounter.      Return in about 4 weeks (around 10/28/2022) for Next scheduled follow up.         This document has been electronically signed by VANDANA Chu  September 30, 2022 08:40 EDT      Part of this note may be an electronic transcription/translation of spoken language to printed text using the Dragon Dictation System.

## 2022-09-30 NOTE — TREATMENT PLAN
Multi-Disciplinary Problems (from Behavioral Health Treatment Plan)    Active Problems     Problem: Depression  Start Date: 08/11/22    Problem Details: The patient self-scales this problem as a 5 with 10 being the worst.        Goal Priority Start Date Expected End Date End Date    Patient will demonstrate the ability to initiate new constructive life skills outside of sessions on a consistent basis. -- 08/11/22 -- --    Goal Details: Progress toward goal:  The patient self-scales this problem as a 3 with 10 being the worst.        Goal Intervention Frequency Start Date End Date    Assist patient in setting attainable activities of daily living goals. PRN 08/11/22 --    Goal Intervention Frequency Start Date End Date    Provide education about depression Weekly 08/11/22 --    Intervention Details: Duration of treatment until until remission of symptoms.        Goal Intervention Frequency Start Date End Date    Assist patient in developing healthy coping strategies. Weekly 08/11/22 --    Intervention Details: Duration of treatment until until remission of symptoms.                    Reviewed By     Shoaib Jacques APRN 09/30/22 0836    Shoaib Jacques APRN 09/30/22 0836                 I have discussed and reviewed this treatment plan with the patient.

## 2022-10-24 ENCOUNTER — OFFICE VISIT (OUTPATIENT)
Dept: PSYCHIATRY | Facility: CLINIC | Age: 37
End: 2022-10-24

## 2022-10-24 ENCOUNTER — TELEPHONE (OUTPATIENT)
Dept: PSYCHIATRY | Facility: CLINIC | Age: 37
End: 2022-10-24

## 2022-10-24 VITALS
DIASTOLIC BLOOD PRESSURE: 90 MMHG | SYSTOLIC BLOOD PRESSURE: 162 MMHG | BODY MASS INDEX: 25.82 KG/M2 | HEIGHT: 76 IN | WEIGHT: 212 LBS

## 2022-10-24 DIAGNOSIS — F33.1 MODERATE EPISODE OF RECURRENT MAJOR DEPRESSIVE DISORDER: Primary | ICD-10-CM

## 2022-10-24 DIAGNOSIS — F41.1 GENERALIZED ANXIETY DISORDER: ICD-10-CM

## 2022-10-24 PROCEDURE — 99214 OFFICE O/P EST MOD 30 MIN: CPT | Performed by: NURSE PRACTITIONER

## 2022-10-24 PROCEDURE — 90833 PSYTX W PT W E/M 30 MIN: CPT | Performed by: NURSE PRACTITIONER

## 2022-10-24 RX ORDER — ESCITALOPRAM OXALATE 20 MG/1
20 TABLET ORAL DAILY
Qty: 30 TABLET | Refills: 2 | Status: SHIPPED | OUTPATIENT
Start: 2022-10-24 | End: 2023-01-30

## 2022-10-24 RX ORDER — HYDROXYZINE HYDROCHLORIDE 25 MG/1
25 TABLET, FILM COATED ORAL 2 TIMES DAILY PRN
Qty: 60 TABLET | Refills: 0 | Status: SHIPPED | OUTPATIENT
Start: 2022-10-24 | End: 2022-11-23

## 2022-10-24 NOTE — TREATMENT PLAN
Multi-Disciplinary Problems (from Behavioral Health Treatment Plan)    Active Problems     Problem: Depression  Start Date: 08/11/22    Problem Details: The patient self-scales this problem as a 5 with 10 being the worst.        Goal Priority Start Date Expected End Date End Date    Patient will demonstrate the ability to initiate new constructive life skills outside of sessions on a consistent basis. -- 08/11/22 -- --    Goal Details: Progress toward goal:  The patient self-scales this problem as a 3 with 10 being the worst.        Goal Intervention Frequency Start Date End Date    Assist patient in setting attainable activities of daily living goals. PRN 08/11/22 --    Goal Intervention Frequency Start Date End Date    Provide education about depression Weekly 08/11/22 --    Intervention Details: Duration of treatment until until remission of symptoms.        Goal Intervention Frequency Start Date End Date    Assist patient in developing healthy coping strategies. Weekly 08/11/22 --    Intervention Details: Duration of treatment until until remission of symptoms.                    Reviewed By     Shoaib Jacques APRN 10/24/22 1765                 I have discussed and reviewed this treatment plan with the patient.

## 2022-10-24 NOTE — TELEPHONE ENCOUNTER
Patient has felt like he is going to HAVE AN ANXIETY OR PANIC ATTACK SINCE 7 AM.  PATIENT DOES NOT FEEL LIKE HE CAN WAIT UNTIL THE 31ST FOR AN APPT.  PT SAYS IT IS WORK RELATED.  PT SCHEDULED FOR 2:00 TODAY   no

## 2022-10-24 NOTE — PROGRESS NOTES
Subjective   Tenzin Eastman is a 37 y.o. male who presents today for follow up.   This provider is located at [address] [place] [address], [address] in [place], [place]. In-person visit consisting of the patient and I only. The patient is accepting of and agreeable to this appointment.     Chief Complaint:  Depression, anxiety    History of Present Illness:     Depression over the past month- has had increase in stress- work related  Sleep- good  Interest- Denies anhedonia  Guilt- Denies  Energy- good  Concentration- denies  Appetite- good  Psychomotor retardation/agitation- Denies  Suicidal thoughts or hopelessness- denies hopelessness, si or hi.     Anxiety over the past month- has been high   Anxious, nervous or worried on most days about a number of events or activities- less worries  No control over worries- able to manage better- working on positive coping skills  Irritability- denies  Concentration- see above  Sleep- see above  Restlessness- denies  Tension in muscles- endorses    Psychiatric Review of Systems: Patient denies any current or previous hallucinations/delusions, paranoia, manic symptoms or PTSD.     PHQ-9 Depression Screening  PHQ-9 Total Score:      Little interest or pleasure in doing things?     Feeling down, depressed, or hopeless?     Trouble falling or staying asleep, or sleeping too much?     Feeling tired or having little energy?     Poor appetite or overeating?     Feeling bad about yourself - or that you are a failure or have let yourself or your family down?     Trouble concentrating on things, such as reading the newspaper or watching television?     Moving or speaking so slowly that other people could have noticed? Or the opposite - being so fidgety or restless that you have been moving around a lot more than usual?     Thoughts that you would be better off dead, or of hurting yourself in some way?     PHQ-9 Total Score       FAYE-7         Past Surgical History:  History  reviewed. No pertinent surgical history.    Problem List:  Patient Active Problem List   Diagnosis   • Varicose veins of lower extremity   • Peripheral venous insufficiency   • Need for hepatitis C screening test   • Viral upper respiratory tract infection   • Hx of anxiety disorder   • Overweight (BMI 25.0-29.9)   • Superficial thrombophlebitis   • Loose body in elbow joint, right       Allergy:   Allergies   Allergen Reactions   • Ibuprofen    • Penicillins         Discontinued Medications:  Medications Discontinued During This Encounter   Medication Reason   • escitalopram (Lexapro) 10 MG tablet Reorder       Current Medications:   Current Outpatient Medications   Medication Sig Dispense Refill   • escitalopram (Lexapro) 20 MG tablet Take 1 tablet by mouth Daily for 90 days. 30 tablet 2   • hydrOXYzine (ATARAX) 25 MG tablet Take 1 tablet by mouth 2 (Two) Times a Day As Needed for Anxiety for up to 30 days. 60 tablet 0     No current facility-administered medications for this visit.       Past Medical History:  Past Medical History:   Diagnosis Date   • Anxiety    • Depression    • Peripheral venous insufficiency     bilateral superficial and deep reflux      • Thrombophlebitis     superficial veins of lower leg - LEFT thigh      • Tobacco dependence syndrome    • Varicose veins of lower extremity     with ulcer AND inflammation - bilateral      • Varicose veins of unspecified lower extremities with other complications     compression stocking       Past Psychiatric History:  Began Treatment: 2018   Diagnoses: Depression, anxiety  Psychiatrist: Previously in [place], [place]  Therapist: Previously in [place], [place]  Admission History: Denies  Medication Trials: Lexapro, wellbutrin, abilify  Self Harm: Denies  Suicide Attempts: Denies    Substance Abuse History:   Types: Denies  Withdrawal Symptoms: n/a  Longest Period Sober: n/a  AA: n/a    Social History:  Martial Status:   Employed: [employer]  Kids:  "Three  House: Lives with wife and children   History: Denies    Social History     Socioeconomic History   • Marital status:    Tobacco Use   • Smoking status: Former     Packs/day: 1.00     Years: 2.00     Pack years: 2.00     Types: Cigarettes     Start date: 2000     Quit date: 2020     Years since quittin.8   • Smokeless tobacco: Never   Vaping Use   • Vaping Use: Every day   • Substances: THC   • Devices: Pre-filled or refillable cartridge, Pre-filled pod   Substance and Sexual Activity   • Alcohol use: Not Currently     Alcohol/week: 3.0 standard drinks     Types: 3 Cans of beer per week   • Drug use: Yes     Types: Marijuana   • Sexual activity: Yes       Family History:   Suicide Attempts: Denies  Suicide Completions: Denies      Family History   Problem Relation Age of Onset   • Depression Mother    • No Known Problems Father    • Dementia Maternal Grandmother    • Depression Other    • Hypertension Other        Developmental History:   Born: [place]  Siblings:Two sisters  Childhood: Denies childhood abuse  High School: Graduate  College: Denies    Access to Firearms: Denies    Mental Status Exam:     Appearance: good eye contact, normal street clothes, groomed, sitting in chair   Behavior: pleasant and cooperative  Motor: no abnormal  Speech: normal rhythm, rate, volume, tone, not hyperverbal, not pressured, normal prosidy  Mood: \" Okay \"  Affect: euthymic  Thought Content: negative suicidal ideations, negative homicidal ideations, negative obsessions  Perceptions: negative auditory hallucinations, negative visual hallucinations, negative delusions, negative paranoia  Thought Process: goal directed, linear  Insight/Judgement: fair/fair  Cognition: grossly intact  Attention: intact  Orientation: person, place, time and situation  Memory: intact    Review of Systems:     Constitutional: Denies fatigue, night sweats  Eyes: Denies double vision, blurred vision  HENT: Denies vertigo, " "recent head injury  Cardiovascular: Denies chest pain, irregular heartbeats  Respiratory: Denies productive cough, shortness of breath  Gastrointestinal: Denies nausea, vomiting  Genitourinary: Denies dysuria, urinary retention  Integument: Denies hair growth change, new skin lesions  Neurologic: Denies altered mental status, seizures  Musculoskeletal: Denies joint swelling, limitation of motion  Endocrine: Denies cold intolerance, heat intolerance  Psychiatric: Admits anxiety, depression. Denies graciela, post-traumatic stress disorder, obsessive compulsive disorder, psychosis.   Allergic-immunologic: Denies frequent illnesses      Vital Signs:   /90   Ht 193 cm (76\")   Wt 96.2 kg (212 lb)   BMI 25.81 kg/m²      Lab Results:   Office Visit on 02/22/2022   Component Date Value Ref Range Status   • SARS Antigen 02/22/2022 Not Detected  Not Detected Final   • Internal Control 02/22/2022 Passed  Passed Final   • Lot Number 02/22/2022 [id number]   Final   • Expiration Date 02/22/2022 [id number]   Final       EKG Results:  No orders to display       Imaging Results:  XR Elbow 3+ View Right    Result Date: 7/11/2022   Suspected posterior joint loose body and olecranon spurring.  No acute osseous abnormality.      REZA JASON MD       Electronically Signed and Approved By: REZA JASON MD on 7/11/2022 at 16:41                 Assessment & Plan   Diagnoses and all orders for this visit:    1. Moderate episode of recurrent major depressive disorder (HCC) (Primary)  -     escitalopram (Lexapro) 20 MG tablet; Take 1 tablet by mouth Daily for 90 days.  Dispense: 30 tablet; Refill: 2    2. Generalized anxiety disorder  -     hydrOXYzine (ATARAX) 25 MG tablet; Take 1 tablet by mouth 2 (Two) Times a Day As Needed for Anxiety for up to 30 days.  Dispense: 60 tablet; Refill: 0      Increase Lexapro with benefit to depression and anxiety. Start hydroxyzine to target anxiety. 17 minutes of supportive psychotherapy with goal to " strengthen defenses, promote problems solving, restore adaptive functioning and provide symptom relief. The therapeutic alliance was strengthened to encourage the patient to express their thoughts and feelings. Esteem building was enhanced through praise, reassurance, normalizing and encouragement. Coping skills were enhanced to build distress tolerance skills and emotional regulation. Patient given education on medication side effects, diagnosis/illness and relapse symptoms. Plan to continue supportive psychotherapy in next appointment to provide symptom relief. 4 weeks    Visit Diagnoses:    ICD-10-CM ICD-9-CM   1. Moderate episode of recurrent major depressive disorder (HCC)  F33.1 296.32   2. Generalized anxiety disorder  F41.1 300.02       PLAN:  1. Safety: No acute safety concerns.   2. Therapy: Declines  3. Risk Assessment: Risk of self-harm acutely is moderate.  Risk factors include anxiety disorder, mood disorder, and recent psychosocial stressors (pandemic). Protective factors include no family history, denies access to guns/weapons, no present SI, no history of suicide attempts or self-harm in the past, minimal AODA, healthcare seeking, future orientation, willingness to engage in care.  Risk of self-harm chronically is also moderate, but could be further elevated in the event of treatment noncompliance and/or AODA.  4. Medications: Increase escitalopram 10 mg to 20mg p.o. daily to target depression and anxiety. Risks, benefits, alternatives discussed with patient including GI upset, nausea vomiting diarrhea, theoretical decrease of seizure threshold predisposing the patient to a slightly higher seizure risk, headaches, sexual dysfunction, serotonin syndrome, bleeding risk, increased suicidality in patients 24 years and younger.  After discussion of these risks and benefits, the patient voiced understanding and agreed to proceed. Start hydroxyzine 25mg po bid prn anxiety. Risks, benefits, alternatives  discussed with patient including sedation, dizziness, fall risk, GI upset, and risk of increased CNS depression and elevated heart rate if taken with other antihistamines.  After discussion of these risks and benefits, the patient voiced understanding and agreed to proceed.  5. Labs/studies: No labs/studies ordered at this time  6. Follow-up: 1 month    Patient screened positive for depression based on a PHQ-9 score of 2 on 9/1/2022. Follow-up recommendations include: Suicide Risk Assessment performed.         TREATMENT PLAN/GOALS: Continue supportive psychotherapy efforts and medications as indicated. Treatment and medication options discussed during today's visit. Patient ackowledged and verbally consented to continue with current treatment plan and was educated on the importance of compliance with treatment and follow-up appointments.      MEDICATION ISSUES:  CRYSTAL reviewed as expected.  Discussed medication options and treatment plan of prescribed medication as well as the risks, benefits, and side effects including potential falls, possible impaired driving and metabolic adversities among others. Patient is agreeable to call the office with any worsening of symptoms or onset of side effects. Patient is agreeable to call 911 or go to the nearest ER should he/she begin having SI/HI. No medication side effects or related complaints today.     MEDS ORDERED DURING VISIT:  New Medications Ordered This Visit   Medications   • escitalopram (Lexapro) 20 MG tablet     Sig: Take 1 tablet by mouth Daily for 90 days.     Dispense:  30 tablet     Refill:  2   • hydrOXYzine (ATARAX) 25 MG tablet     Sig: Take 1 tablet by mouth 2 (Two) Times a Day As Needed for Anxiety for up to 30 days.     Dispense:  60 tablet     Refill:  0       Return in about 4 weeks (around 11/21/2022) for Next scheduled follow up.         This document has been electronically signed by VANDANA Chu  October 24, 2022 14:22 EDT      Part of this  note may be an electronic transcription/translation of spoken language to printed text using the Dragon Dictation System.

## 2022-10-31 ENCOUNTER — OFFICE VISIT (OUTPATIENT)
Dept: PSYCHIATRY | Facility: CLINIC | Age: 37
End: 2022-10-31

## 2022-10-31 VITALS
BODY MASS INDEX: 25.74 KG/M2 | HEIGHT: 76 IN | SYSTOLIC BLOOD PRESSURE: 148 MMHG | HEART RATE: 72 BPM | WEIGHT: 211.4 LBS | DIASTOLIC BLOOD PRESSURE: 89 MMHG

## 2022-10-31 DIAGNOSIS — F41.1 GENERALIZED ANXIETY DISORDER: ICD-10-CM

## 2022-10-31 DIAGNOSIS — F33.1 MODERATE EPISODE OF RECURRENT MAJOR DEPRESSIVE DISORDER: Primary | ICD-10-CM

## 2022-10-31 PROCEDURE — 90833 PSYTX W PT W E/M 30 MIN: CPT | Performed by: NURSE PRACTITIONER

## 2022-10-31 PROCEDURE — 99214 OFFICE O/P EST MOD 30 MIN: CPT | Performed by: NURSE PRACTITIONER

## 2022-10-31 NOTE — TREATMENT PLAN
Multi-Disciplinary Problems (from Behavioral Health Treatment Plan)    Active Problems     Problem: Depression  Start Date: 08/11/22    Problem Details: The patient self-scales this problem as a 5 with 10 being the worst.        Goal Priority Start Date Expected End Date End Date    Patient will demonstrate the ability to initiate new constructive life skills outside of sessions on a consistent basis. -- 08/11/22 -- --    Goal Details: Progress toward goal:  3/10        Goal Intervention Frequency Start Date End Date    Assist patient in setting attainable activities of daily living goals. PRN 08/11/22 --    Goal Intervention Frequency Start Date End Date    Provide education about depression Weekly 08/11/22 --    Intervention Details: Duration of treatment until until remission of symptoms.        Goal Intervention Frequency Start Date End Date    Assist patient in developing healthy coping strategies. Weekly 08/11/22 --    Intervention Details: Duration of treatment until until remission of symptoms.                    Reviewed By     Shoaib Jacques APRN 10/31/22 0978                 I have discussed and reviewed this treatment plan with the patient.

## 2022-10-31 NOTE — PROGRESS NOTES
Subjective   Tenzin Eastman is a 37 y.o. male who presents today for follow up.   This provider is located at [address] [place] [address], [address] in [place], [place]. In-person visit consisting of the patient and I only. The patient is accepting of and agreeable to this appointment.     Chief Complaint:  Depression, anxiety    History of Present Illness:     Depression over the past month- has improved  Sleep- good  Interest- Denies anhedonia  Guilt- Denies  Energy- good  Concentration- denies  Appetite- good  Psychomotor retardation/agitation- Denies  Suicidal thoughts or hopelessness- denies hopelessness, si or hi.     Anxiety over the past month- has improved  Anxious, nervous or worried on most days about a number of events or activities- less worries  No control over worries- able to manage better- working on positive coping skills  Irritability- denies  Concentration- see above  Sleep- see above  Restlessness- denies  Tension in muscles- endorses    Psychiatric Review of Systems: Patient denies any current or previous hallucinations/delusions, paranoia, manic symptoms or PTSD.     PHQ-9 Depression Screening  PHQ-9 Total Score:      Little interest or pleasure in doing things?     Feeling down, depressed, or hopeless?     Trouble falling or staying asleep, or sleeping too much?     Feeling tired or having little energy?     Poor appetite or overeating?     Feeling bad about yourself - or that you are a failure or have let yourself or your family down?     Trouble concentrating on things, such as reading the newspaper or watching television?     Moving or speaking so slowly that other people could have noticed? Or the opposite - being so fidgety or restless that you have been moving around a lot more than usual?     Thoughts that you would be better off dead, or of hurting yourself in some way?     PHQ-9 Total Score       FAYE-7  Feeling nervous, anxious or on edge: Several days  Not being able to stop or  control worrying: More than half the days  Worrying too much about different things: Several days  Trouble Relaxing: More than half the days  Being so restless that it is hard to sit still: Not at all  Feeling afraid as if something awful might happen: Several days  Becoming easily annoyed or irritable: Several days  FAYE 7 Total Score: 8  If you checked any problems, how difficult have these problems made it for you to do your work, take care of things at home, or get along with other people: Somewhat difficult      Past Surgical History:  History reviewed. No pertinent surgical history.    Problem List:  Patient Active Problem List   Diagnosis   • Varicose veins of lower extremity   • Peripheral venous insufficiency   • Need for hepatitis C screening test   • Viral upper respiratory tract infection   • Hx of anxiety disorder   • Overweight (BMI 25.0-29.9)   • Superficial thrombophlebitis   • Loose body in elbow joint, right       Allergy:   Allergies   Allergen Reactions   • Ibuprofen    • Penicillins         Discontinued Medications:  There are no discontinued medications.    Current Medications:   Current Outpatient Medications   Medication Sig Dispense Refill   • escitalopram (Lexapro) 20 MG tablet Take 1 tablet by mouth Daily for 90 days. 30 tablet 2   • hydrOXYzine (ATARAX) 25 MG tablet Take 1 tablet by mouth 2 (Two) Times a Day As Needed for Anxiety for up to 30 days. 60 tablet 0     No current facility-administered medications for this visit.       Past Medical History:  Past Medical History:   Diagnosis Date   • Anxiety    • Depression    • Peripheral venous insufficiency     bilateral superficial and deep reflux      • Thrombophlebitis     superficial veins of lower leg - LEFT thigh      • Tobacco dependence syndrome    • Varicose veins of lower extremity     with ulcer AND inflammation - bilateral      • Varicose veins of unspecified lower extremities with other complications     compression stocking  "      Past Psychiatric History:  Began Treatment: 2018   Diagnoses: Depression, anxiety  Psychiatrist: Previously in [place], [place]  Therapist: Previously in [place], [place]  Admission History: Denies  Medication Trials: Lexapro, wellbutrin, abilify  Self Harm: Denies  Suicide Attempts: Denies    Substance Abuse History:   Types: Denies  Withdrawal Symptoms: n/a  Longest Period Sober: n/a  AA: n/a    Social History:  Martial Status:   Employed: [employer]  Kids: Three  House: Lives with wife and children   History: Denies    Social History     Socioeconomic History   • Marital status:    Tobacco Use   • Smoking status: Former     Packs/day: 1.00     Years: 2.00     Pack years: 2.00     Types: Cigarettes     Start date: 2000     Quit date: 2020     Years since quittin.8   • Smokeless tobacco: Never   Vaping Use   • Vaping Use: Every day   • Substances: THC   • Devices: Pre-filled or refillable cartridge, Pre-filled pod   Substance and Sexual Activity   • Alcohol use: Not Currently     Alcohol/week: 3.0 standard drinks     Types: 3 Cans of beer per week   • Drug use: Yes     Types: Marijuana   • Sexual activity: Yes       Family History:   Suicide Attempts: Denies  Suicide Completions: Denies      Family History   Problem Relation Age of Onset   • Depression Mother    • No Known Problems Father    • Dementia Maternal Grandmother    • Depression Other    • Hypertension Other        Developmental History:   Born: [place]  Siblings:Two sisters  Childhood: Denies childhood abuse  High School: Graduate  College: Denies    Access to Firearms: Denies    Mental Status Exam:     Appearance: good eye contact, normal street clothes, groomed, sitting in chair   Behavior: pleasant and cooperative  Motor: no abnormal  Speech: normal rhythm, rate, volume, tone, not hyperverbal, not pressured, normal prosidy  Mood: \" Okay \"  Affect: euthymic  Thought Content: negative suicidal ideations, negative " "homicidal ideations, negative obsessions  Perceptions: negative auditory hallucinations, negative visual hallucinations, negative delusions, negative paranoia  Thought Process: goal directed, linear  Insight/Judgement: fair/fair  Cognition: grossly intact  Attention: intact  Orientation: person, place, time and situation  Memory: intact    Review of Systems:     Constitutional: Denies fatigue, night sweats  Eyes: Denies double vision, blurred vision  HENT: Denies vertigo, recent head injury  Cardiovascular: Denies chest pain, irregular heartbeats  Respiratory: Denies productive cough, shortness of breath  Gastrointestinal: Denies nausea, vomiting  Genitourinary: Denies dysuria, urinary retention  Integument: Denies hair growth change, new skin lesions  Neurologic: Denies altered mental status, seizures  Musculoskeletal: Denies joint swelling, limitation of motion  Endocrine: Denies cold intolerance, heat intolerance  Psychiatric: Admits anxiety, depression. Denies graciela, post-traumatic stress disorder, obsessive compulsive disorder, psychosis.   Allergic-immunologic: Denies frequent illnesses      Vital Signs:   /89   Pulse 72   Ht 193 cm (76\")   Wt 95.9 kg (211 lb 6.4 oz)   BMI 25.73 kg/m²      Lab Results:   Office Visit on 02/22/2022   Component Date Value Ref Range Status   • SARS Antigen 02/22/2022 Not Detected  Not Detected Final   • Internal Control 02/22/2022 Passed  Passed Final   • Lot Number 02/22/2022 [id number]   Final   • Expiration Date 02/22/2022 [id number]   Final       EKG Results:  No orders to display       Imaging Results:  XR Elbow 3+ View Right    Result Date: 7/11/2022   Suspected posterior joint loose body and olecranon spurring.  No acute osseous abnormality.      REZA JASON MD       Electronically Signed and Approved By: REZA JASON MD on 7/11/2022 at 16:41                 Assessment & Plan   Diagnoses and all orders for this visit:    1. Moderate episode of recurrent major " depressive disorder (HCC) (Primary)    2. Generalized anxiety disorder      Continue Lexapro with benefit to depression and anxiety. Continue hydroxyzine to target anxiety. 18 minutes of supportive psychotherapy with goal to strengthen defenses, promote problems solving, restore adaptive functioning and provide symptom relief. The therapeutic alliance was strengthened to encourage the patient to express their thoughts and feelings. Esteem building was enhanced through praise, reassurance, normalizing and encouragement. Coping skills were enhanced to build distress tolerance skills and emotional regulation. Patient given education on medication side effects, diagnosis/illness and relapse symptoms. Plan to continue supportive psychotherapy in next appointment to provide symptom relief. 4 weeks    Visit Diagnoses:    ICD-10-CM ICD-9-CM   1. Moderate episode of recurrent major depressive disorder (HCC)  F33.1 296.32   2. Generalized anxiety disorder  F41.1 300.02       PLAN:  1. Safety: No acute safety concerns.   2. Therapy: Declines  3. Risk Assessment: Risk of self-harm acutely is moderate.  Risk factors include anxiety disorder, mood disorder, and recent psychosocial stressors (pandemic). Protective factors include no family history, denies access to guns/weapons, no present SI, no history of suicide attempts or self-harm in the past, minimal AODA, healthcare seeking, future orientation, willingness to engage in care.  Risk of self-harm chronically is also moderate, but could be further elevated in the event of treatment noncompliance and/or AODA.  4. Medications: Continue escitalopram 20mg p.o. daily to target depression and anxiety. Risks, benefits, alternatives discussed with patient including GI upset, nausea vomiting diarrhea, theoretical decrease of seizure threshold predisposing the patient to a slightly higher seizure risk, headaches, sexual dysfunction, serotonin syndrome, bleeding risk, increased  suicidality in patients 24 years and younger.  After discussion of these risks and benefits, the patient voiced understanding and agreed to proceed. Continue hydroxyzine 25mg po bid prn anxiety. Risks, benefits, alternatives discussed with patient including sedation, dizziness, fall risk, GI upset, and risk of increased CNS depression and elevated heart rate if taken with other antihistamines.  After discussion of these risks and benefits, the patient voiced understanding and agreed to proceed.  5. Labs/studies: No labs/studies ordered at this time  6. Follow-up: 1 month    Patient screened positive for depression based on a PHQ-9 score of 1 on 10/31/2022. Follow-up recommendations include: Suicide Risk Assessment performed.         TREATMENT PLAN/GOALS: Continue supportive psychotherapy efforts and medications as indicated. Treatment and medication options discussed during today's visit. Patient ackowledged and verbally consented to continue with current treatment plan and was educated on the importance of compliance with treatment and follow-up appointments.      MEDICATION ISSUES:  CRYSTAL reviewed as expected.  Discussed medication options and treatment plan of prescribed medication as well as the risks, benefits, and side effects including potential falls, possible impaired driving and metabolic adversities among others. Patient is agreeable to call the office with any worsening of symptoms or onset of side effects. Patient is agreeable to call 911 or go to the nearest ER should he/she begin having SI/HI. No medication side effects or related complaints today.     MEDS ORDERED DURING VISIT:  No orders of the defined types were placed in this encounter.      Return in about 4 weeks (around 11/28/2022) for Next scheduled follow up.         This document has been electronically signed by VANDANA Chu  October 31, 2022 09:20 EDT      Part of this note may be an electronic transcription/translation of spoken  language to printed text using the Dragon Dictation System.

## 2022-12-12 ENCOUNTER — OFFICE VISIT (OUTPATIENT)
Dept: PSYCHIATRY | Facility: CLINIC | Age: 37
End: 2022-12-12

## 2022-12-12 ENCOUNTER — TELEPHONE (OUTPATIENT)
Dept: PSYCHIATRY | Facility: CLINIC | Age: 37
End: 2022-12-12

## 2022-12-12 VITALS
BODY MASS INDEX: 27.58 KG/M2 | SYSTOLIC BLOOD PRESSURE: 153 MMHG | HEART RATE: 63 BPM | WEIGHT: 226.6 LBS | DIASTOLIC BLOOD PRESSURE: 96 MMHG

## 2022-12-12 DIAGNOSIS — F41.1 GENERALIZED ANXIETY DISORDER: ICD-10-CM

## 2022-12-12 DIAGNOSIS — F33.1 MODERATE EPISODE OF RECURRENT MAJOR DEPRESSIVE DISORDER: Primary | ICD-10-CM

## 2022-12-12 PROCEDURE — 99214 OFFICE O/P EST MOD 30 MIN: CPT | Performed by: NURSE PRACTITIONER

## 2022-12-12 PROCEDURE — 90833 PSYTX W PT W E/M 30 MIN: CPT | Performed by: NURSE PRACTITIONER

## 2022-12-12 RX ORDER — HYDROCODONE BITARTRATE AND ACETAMINOPHEN 7.5; 325 MG/1; MG/1
TABLET ORAL
COMMUNITY
Start: 2022-11-17 | End: 2023-01-09

## 2022-12-12 NOTE — PROGRESS NOTES
Subjective   Tenzin Eastman is a 37 y.o. male who presents today for follow up.   This provider is located at [address] [place] [address], [address] in [place], [place]. In-person visit consisting of the patient and I only. The patient is accepting of and agreeable to this appointment.     Chief Complaint:  Depression, anxiety    History of Present Illness:     Depression over the past month- has had increase in stress- work- going to mandatory overtime  Depressed mood: n   Markedly diminished interest or pleasure: n  Significant weight loss when not dieting or weight gain, or decrease or increase in appetite: n  Insomnia or hypersomnia: n  Psychomotor agitation or retardation: n  Fatigue or loss of energy: n  Feelings of worthlessness or excessive or inappropriate guilt: n  Diminished ability to think or concentrate, or indecisiveness: n  Recurrent thoughts of death, recurrent suicidal ideation without a specific plan, or suicide attempt or specific plan for committing suicide- denies    Anxiety over the past month- has been high at times  Anxious, nervous or worried on most days about a number of events or activities- excessive worries  No control over worries- difficult to manage at times- working on positive coping skills  Irritability- denies  Fatigue: see above  Difficulty concentrating- see above  Sleep disturbance- see above  Restlessness- denies  Tension in muscles- endorses    Psychiatric Review of Systems: Patient denies any current or previous hallucinations/delusions, paranoia, manic symptoms or PTSD.     PHQ-9 Depression Screening  PHQ-9 Total Score:      Little interest or pleasure in doing things?     Feeling down, depressed, or hopeless?     Trouble falling or staying asleep, or sleeping too much?     Feeling tired or having little energy?     Poor appetite or overeating?     Feeling bad about yourself - or that you are a failure or have let yourself or your family down?     Trouble  concentrating on things, such as reading the newspaper or watching television?     Moving or speaking so slowly that other people could have noticed? Or the opposite - being so fidgety or restless that you have been moving around a lot more than usual?     Thoughts that you would be better off dead, or of hurting yourself in some way?     PHQ-9 Total Score       FAYE-7         Past Surgical History:  History reviewed. No pertinent surgical history.    Problem List:  Patient Active Problem List   Diagnosis   • Varicose veins of lower extremity   • Peripheral venous insufficiency   • Need for hepatitis C screening test   • Viral upper respiratory tract infection   • Hx of anxiety disorder   • Overweight (BMI 25.0-29.9)   • Superficial thrombophlebitis   • Loose body in elbow joint, right       Allergy:   Allergies   Allergen Reactions   • Ibuprofen    • Penicillins         Discontinued Medications:  There are no discontinued medications.    Current Medications:   Current Outpatient Medications   Medication Sig Dispense Refill   • escitalopram (Lexapro) 20 MG tablet Take 1 tablet by mouth Daily for 90 days. 30 tablet 2   • HYDROcodone-acetaminophen (NORCO) 7.5-325 MG per tablet TAKE 1 TABLET BY MOUTH EVERY 6 HOURS AS NEEDED FOR FOR PAIN     • Brexpiprazole 0.5 MG tablet Take 0.5 mg by mouth Daily for 28 days. 28 tablet 0   • Brexpiprazole 0.5 MG tablet Take 0.5 mg by mouth Daily for 30 days. 30 tablet 0     No current facility-administered medications for this visit.       Past Medical History:  Past Medical History:   Diagnosis Date   • Anxiety    • Depression    • Peripheral venous insufficiency     bilateral superficial and deep reflux      • Thrombophlebitis     superficial veins of lower leg - LEFT thigh      • Tobacco dependence syndrome    • Varicose veins of lower extremity     with ulcer AND inflammation - bilateral      • Varicose veins of unspecified lower extremities with other complications     compression  "stocking       Past Psychiatric History:  Began Treatment: 2018   Diagnoses: Depression, anxiety  Psychiatrist: Previously in [place], [place]  Therapist: Previously in [place], [place]  Admission History: Denies  Medication Trials: Lexapro, wellbutrin, abilify  Self Harm: Denies  Suicide Attempts: Denies    Substance Abuse History:   Types: Denies  Withdrawal Symptoms: n/a  Longest Period Sober: n/a  AA: n/a    Social History:  Martial Status:   Employed: [employer]  Kids: Three  House: Lives with wife and children   History: Denies    Social History     Socioeconomic History   • Marital status:    Tobacco Use   • Smoking status: Former     Packs/day: 1.00     Years: 2.00     Pack years: 2.00     Types: Cigarettes     Start date: 2000     Quit date: 2020     Years since quittin.9   • Smokeless tobacco: Never   Vaping Use   • Vaping Use: Every day   • Substances: THC   • Devices: Pre-filled or refillable cartridge, Pre-filled pod   Substance and Sexual Activity   • Alcohol use: Not Currently     Alcohol/week: 3.0 standard drinks     Types: 3 Cans of beer per week   • Drug use: Yes     Types: Marijuana   • Sexual activity: Yes       Family History:   Suicide Attempts: Denies  Suicide Completions: Denies      Family History   Problem Relation Age of Onset   • Depression Mother    • No Known Problems Father    • Dementia Maternal Grandmother    • Depression Other    • Hypertension Other        Developmental History:   Born: [place]  Siblings:Two sisters  Childhood: Denies childhood abuse  High School: Graduate  College: Denies    Access to Firearms: Denies    Mental Status Exam:     Appearance: good eye contact, normal street clothes, groomed, sitting in chair   Behavior: pleasant and cooperative  Motor: no abnormal  Speech: normal rhythm, rate, volume, tone, not hyperverbal, not pressured, normal prosidy  Mood: \"Okay\"  Affect: euthymic  Thought Content: negative suicidal ideations, " negative homicidal ideations, negative obsessions  Perceptions: negative auditory hallucinations, negative visual hallucinations, negative delusions, negative paranoia  Thought Process: goal directed, linear  Insight/Judgement: fair/fair  Cognition: grossly intact  Attention: intact  Orientation: person, place, time and situation  Memory: intact    Review of Systems:     Constitutional: Denies fatigue, night sweats  Eyes: Denies double vision, blurred vision  HENT: Denies vertigo, recent head injury  Cardiovascular: Denies chest pain, irregular heartbeats  Respiratory: Denies productive cough, shortness of breath  Gastrointestinal: Denies nausea, vomiting  Genitourinary: Denies dysuria, urinary retention  Integument: Denies hair growth change, new skin lesions  Neurologic: Denies altered mental status, seizures  Musculoskeletal: Denies joint swelling, limitation of motion  Endocrine: Denies cold intolerance, heat intolerance  Psychiatric: Admits anxiety, depression.  Denies psychosis, graciela, post-traumatic stress disorder, obsessive compulsive disorder.   Allergic-immunologic: Denies frequent illnesses      Vital Signs:   /96   Pulse 63   Wt 103 kg (226 lb 9.6 oz)   BMI 27.58 kg/m²      Lab Results:   Office Visit on 02/22/2022   Component Date Value Ref Range Status   • SARS Antigen 02/22/2022 Not Detected  Not Detected Final   • Internal Control 02/22/2022 Passed  Passed Final   • Lot Number 02/22/2022 [id number]   Final   • Expiration Date 02/22/2022 [id number]   Final       EKG Results:  No orders to display       Imaging Results:  XR Elbow 3+ View Right    Result Date: 7/11/2022   Suspected posterior joint loose body and olecranon spurring.  No acute osseous abnormality.      REZA JASON MD       Electronically Signed and Approved By: REZA JASON MD on 7/11/2022 at 16:41                 Assessment & Plan   Diagnoses and all orders for this visit:    1. Moderate episode of recurrent major depressive  disorder (HCC) (Primary)  -     Brexpiprazole 0.5 MG tablet; Take 0.5 mg by mouth Daily for 28 days.  Dispense: 28 tablet; Refill: 0  -     Ambulatory Referral to Psychotherapy  -     Brexpiprazole 0.5 MG tablet; Take 0.5 mg by mouth Daily for 30 days.  Dispense: 30 tablet; Refill: 0    2. Generalized anxiety disorder  -     Ambulatory Referral to Psychotherapy      Continue Lexapro with benefit to depression and anxiety. Continue hydroxyzine to target anxiety. Start brexpiprazole to target depression. 17 minutes of supportive psychotherapy with goal to strengthen defenses, promote problems solving, restore adaptive functioning and provide symptom relief. The therapeutic alliance was strengthened to encourage the patient to express their thoughts and feelings. Esteem building was enhanced through praise, reassurance, normalizing and encouragement. Coping skills were enhanced to build distress tolerance skills and emotional regulation. Allowed patient to freely discuss issues without interruption or judgement with unconditional positive regard, active listening skills, and empathy. Provided a safe, confidential environment to facilitate the development of a positive therapeutic relationship and encourage open, honest communication. Assisted patient in identifying risk factors which would indicate the need for higher level of care including thoughts to harm self or others and/or self-harming behavior and encouraged patient to contact this office, call 911, or present to the nearest emergency room should any of these events occur. Assisted patient in processing session content; acknowledged and normalized patient's thoughts, feelings, and concerns by utilizing a person-centered approach in efforts to build appropriate rapport and a positive therapeutic relationship with open and honest communication. Patient given education on medication side effects, diagnosis/illness and relapse symptoms. Plan to continue supportive  psychotherapy in next appointment to provide symptom relief. 4 weeks    Diagnoses: as above  Symptoms: as above  Functional status: good  Mental Status Exam: as above     Treatment plan: medication management and supportive psychotherapy  Prognosis: good  Progress: continued improvement    Visit Diagnoses:    ICD-10-CM ICD-9-CM   1. Moderate episode of recurrent major depressive disorder (HCC)  F33.1 296.32   2. Generalized anxiety disorder  F41.1 300.02       PLAN:  1. Safety: No acute safety concerns.   2. Therapy: Rubina Lambert  3. Risk Assessment: Risk of self-harm acutely is moderate.  Risk factors include anxiety disorder, mood disorder, and recent psychosocial stressors (pandemic). Protective factors include no family history, denies access to guns/weapons, no present SI, no history of suicide attempts or self-harm in the past, minimal AODA, healthcare seeking, future orientation, willingness to engage in care.  Risk of self-harm chronically is also moderate, but could be further elevated in the event of treatment noncompliance and/or AODA.  4. Medications: Continue escitalopram 20mg p.o. daily to target depression and anxiety. Risks, benefits, alternatives discussed with patient including GI upset, nausea vomiting diarrhea, theoretical decrease of seizure threshold predisposing the patient to a slightly higher seizure risk, headaches, sexual dysfunction, serotonin syndrome, bleeding risk, increased suicidality in patients 24 years and younger.  After discussion of these risks and benefits, the patient voiced understanding and agreed to proceed. Continue hydroxyzine 25mg po bid prn anxiety. Risks, benefits, alternatives discussed with patient including sedation, dizziness, fall risk, GI upset, and risk of increased CNS depression and elevated heart rate if taken with other antihistamines.  After discussion of these risks and benefits, the patient voiced understanding and agreed to proceed. START  brexpiprazole 0.5mg po qday to target depression. Risks, benefits, alternatives discussed with patient including increased energy, exacerbation of irritability, akathisia, GI upset, orthostatic hypotension, increased appetite, tardive dyskinesia.  After discussion of these risks and benefits, the patient voiced understanding and agreed to proceed.  5. Labs/studies: No labs/studies ordered at this time  6. Follow-up: 1 month    Patient screened positive for depression based on a PHQ-9 score of 1 on 10/31/2022. Follow-up recommendations include: Suicide Risk Assessment performed.         TREATMENT PLAN/GOALS: Continue supportive psychotherapy efforts and medications as indicated. Treatment and medication options discussed during today's visit. Patient ackowledged and verbally consented to continue with current treatment plan and was educated on the importance of compliance with treatment and follow-up appointments.      MEDICATION ISSUES:  CRYSTAL reviewed as expected.  Discussed medication options and treatment plan of prescribed medication as well as the risks, benefits, and side effects including potential falls, possible impaired driving and metabolic adversities among others. Patient is agreeable to call the office with any worsening of symptoms or onset of side effects. Patient is agreeable to call 911 or go to the nearest ER should he/she begin having SI/HI. No medication side effects or related complaints today.     MEDS ORDERED DURING VISIT:  New Medications Ordered This Visit   Medications   • Brexpiprazole 0.5 MG tablet     Sig: Take 0.5 mg by mouth Daily for 28 days.     Dispense:  28 tablet     Refill:  0     Lot [id number] EXP 05/2023   • Brexpiprazole 0.5 MG tablet     Sig: Take 0.5 mg by mouth Daily for 30 days.     Dispense:  30 tablet     Refill:  0       Return in about 4 weeks (around 1/9/2023) for Next scheduled follow up.         This document has been electronically signed by Shoaib Jacques  APRN  December 12, 2022 16:18 EST      Part of this note may be an electronic transcription/translation of spoken language to printed text using the Dragon Dictation System.

## 2022-12-12 NOTE — TELEPHONE ENCOUNTER
A PRIOR AUTHORIZATION HAS BEEN STARTED FOR THIS PATIENT'S REXULTI.  AWAITING RESPONSE FROM INSURANCE

## 2022-12-13 NOTE — TELEPHONE ENCOUNTER
Approval for Rexulti 0.5mg Rcvd.  12 fills from 12/12/22 to 12/11/2023.  Scanned in media.  Called patient but no voicemail set up.

## 2022-12-17 DIAGNOSIS — F33.1 MODERATE EPISODE OF RECURRENT MAJOR DEPRESSIVE DISORDER: ICD-10-CM

## 2023-01-09 ENCOUNTER — OFFICE VISIT (OUTPATIENT)
Dept: PSYCHIATRY | Facility: CLINIC | Age: 38
End: 2023-01-09
Payer: COMMERCIAL

## 2023-01-09 VITALS
WEIGHT: 238 LBS | SYSTOLIC BLOOD PRESSURE: 130 MMHG | HEIGHT: 76 IN | BODY MASS INDEX: 28.98 KG/M2 | HEART RATE: 78 BPM | DIASTOLIC BLOOD PRESSURE: 75 MMHG

## 2023-01-09 DIAGNOSIS — F33.1 MODERATE EPISODE OF RECURRENT MAJOR DEPRESSIVE DISORDER: Primary | ICD-10-CM

## 2023-01-09 DIAGNOSIS — F41.1 GENERALIZED ANXIETY DISORDER: ICD-10-CM

## 2023-01-09 PROCEDURE — 90833 PSYTX W PT W E/M 30 MIN: CPT | Performed by: NURSE PRACTITIONER

## 2023-01-09 PROCEDURE — 99214 OFFICE O/P EST MOD 30 MIN: CPT | Performed by: NURSE PRACTITIONER

## 2023-01-09 RX ORDER — BREXPIPRAZOLE 0.5 MG/1
TABLET ORAL
Qty: 30 TABLET | Refills: 0 | OUTPATIENT
Start: 2023-01-09

## 2023-01-09 NOTE — LETTER
January 9, 2023     Patient: Tenzin Eastman   YOB: 1985   Date of Visit: 1/9/2023       To Whom It May Concern:    Tenzin Eastman was seen in my office today. May return to work tomorrow January 10th, 2023.     If you have any questions please call the office at [phone].           Sincerely,        VANDANA Chu    CC: Tenzin Eastman

## 2023-01-09 NOTE — PROGRESS NOTES
Subjective   Tenzin Eastman is a 37 y.o. male who presents today for follow up.   This provider is located at [address] [place] [address], [address] in [place], [place]. In-person visit consisting of the patient and I only. The patient is accepting of and agreeable to this appointment.     Chief Complaint:  Depression, anxiety    History of Present Illness:     Depression over the past month  Depressed mood: y- at times  Markedly diminished interest or pleasure: y  Significant weight loss when not dieting or weight gain, or decrease or increase in appetite: n  Insomnia or hypersomnia: n  Psychomotor agitation or retardation: n  Fatigue or loss of energy: n  Feelings of worthlessness or excessive or inappropriate guilt: n  Diminished ability to think or concentrate, or indecisiveness: n  Recurrent thoughts of death, recurrent suicidal ideation without a specific plan, or suicide attempt or specific plan for committing suicide- denies    Anxiety over the past month  Anxious, nervous or worried on most days about a number of events or activities- y  No control over worries- y- working on positive coping skills  Irritability- denies  Fatigue: see above  Difficulty concentrating- see above  Sleep disturbance- see above  Restlessness- denies  Tension in muscles- denies    Psychiatric Review of Systems: Patient denies any current or previous hallucinations/delusions, paranoia, manic symptoms or PTSD.     PHQ-9 Depression Screening  PHQ-9 Total Score:      Little interest or pleasure in doing things?     Feeling down, depressed, or hopeless?     Trouble falling or staying asleep, or sleeping too much?     Feeling tired or having little energy?     Poor appetite or overeating?     Feeling bad about yourself - or that you are a failure or have let yourself or your family down?     Trouble concentrating on things, such as reading the newspaper or watching television?     Moving or speaking so slowly that other people could  have noticed? Or the opposite - being so fidgety or restless that you have been moving around a lot more than usual?     Thoughts that you would be better off dead, or of hurting yourself in some way?     PHQ-9 Total Score       FAYE-7         Past Surgical History:  History reviewed. No pertinent surgical history.    Problem List:  Patient Active Problem List   Diagnosis   • Varicose veins of lower extremity   • Peripheral venous insufficiency   • Need for hepatitis C screening test   • Viral upper respiratory tract infection   • Hx of anxiety disorder   • Overweight (BMI 25.0-29.9)   • Superficial thrombophlebitis   • Loose body in elbow joint, right       Allergy:   Allergies   Allergen Reactions   • Ibuprofen    • Penicillins         Discontinued Medications:  Medications Discontinued During This Encounter   Medication Reason   • HYDROcodone-acetaminophen (NORCO) 7.5-325 MG per tablet *Therapy completed   • Brexpiprazole 0.5 MG tablet Historical Med - Therapy completed       Current Medications:   Current Outpatient Medications   Medication Sig Dispense Refill   • Brexpiprazole 0.5 MG tablet Take 0.5 mg by mouth Daily for 30 days. 30 tablet 0   • escitalopram (Lexapro) 20 MG tablet Take 1 tablet by mouth Daily for 90 days. 30 tablet 2     No current facility-administered medications for this visit.       Past Medical History:  Past Medical History:   Diagnosis Date   • Anxiety    • Depression    • Peripheral venous insufficiency     bilateral superficial and deep reflux      • Thrombophlebitis     superficial veins of lower leg - LEFT thigh      • Tobacco dependence syndrome    • Varicose veins of lower extremity     with ulcer AND inflammation - bilateral      • Varicose veins of unspecified lower extremities with other complications     compression stocking       Past Psychiatric History:  Began Treatment: 2018   Diagnoses: Depression, anxiety  Psychiatrist: Previously in [place], [place]  Therapist: Previously  in [place], [place]  Admission History: Denies  Medication Trials: Lexapro, wellbutrin, abilify  Self Harm: Denies  Suicide Attempts: Denies    Substance Abuse History:   Types: Denies  Withdrawal Symptoms: n/a  Longest Period Sober: n/a  AA: n/a    Social History:  Martial Status:   Employed: [employer]  Kids: Three  House: Lives with wife and children   History: Denies    Social History     Socioeconomic History   • Marital status:    Tobacco Use   • Smoking status: Former     Packs/day: 1.00     Years: 2.00     Pack years: 2.00     Types: Cigarettes     Start date: 1/1/2000     Quit date: 1/1/2020     Years since quitting: 3.0   • Smokeless tobacco: Never   Vaping Use   • Vaping Use: Every day   • Substances: THC   • Devices: Pre-filled or refillable cartridge, Pre-filled pod   Substance and Sexual Activity   • Alcohol use: Not Currently     Alcohol/week: 3.0 standard drinks     Types: 3 Cans of beer per week   • Drug use: Yes     Types: Marijuana   • Sexual activity: Yes       Family History:   Suicide Attempts: Denies  Suicide Completions: Denies      Family History   Problem Relation Age of Onset   • Depression Mother    • No Known Problems Father    • Dementia Maternal Grandmother    • Depression Other    • Hypertension Other        Developmental History:   Born: [place]  Siblings:Two sisters  Childhood: Denies childhood abuse  High School: Graduate  College: Denies    Access to Firearms: Denies    Mental Status Exam:     Appearance: good eye contact, normal street clothes, groomed, sitting in chair   Behavior: pleasant and cooperative  Motor: no abnormal  Speech: normal rhythm, rate, volume, tone, not hyperverbal, not pressured, normal prosidy  Mood: \"Okay\"  Affect: euthymic  Thought Content: negative suicidal ideations, negative homicidal ideations, negative obsessions  Perceptions: negative auditory hallucinations, negative visual hallucinations, negative delusions, negative  paranoia  Thought Process: goal directed, linear  Insight/Judgement: fair/fair  Cognition: grossly intact  Attention: intact  Orientation: person, place, time and situation  Memory: intact    Review of Systems:     Constitutional: Denies fatigue, night sweats  Eyes: Denies double vision, blurred vision  HENT: Denies vertigo, recent head injury  Cardiovascular: Denies chest pain, irregular heartbeats  Respiratory: Denies productive cough, shortness of breath  Gastrointestinal: Denies nausea, vomiting  Genitourinary: Denies dysuria, urinary retention  Integument: Denies hair growth change, new skin lesions  Neurologic: Denies altered mental status, seizures  Musculoskeletal: Denies joint swelling, limitation of motion  Endocrine: Denies cold intolerance, heat intolerance  Psychiatric: Admits anxiety, depression.  Denies psychosis, graciela, post-traumatic stress disorder, obsessive compulsive disorder.   Allergic-immunologic: Denies frequent illnesses      Vital Signs:   /75   Pulse 78   Ht 193 cm (76\")   Wt 108 kg (238 lb)   BMI 28.97 kg/m²      Lab Results:   Office Visit on 02/22/2022   Component Date Value Ref Range Status   • SARS Antigen 02/22/2022 Not Detected  Not Detected Final   • Internal Control 02/22/2022 Passed  Passed Final   • Lot Number 02/22/2022 [id number]   Final   • Expiration Date 02/22/2022 [id number]   Final       EKG Results:  No orders to display       Imaging Results:  XR Elbow 3+ View Right    Result Date: 7/11/2022   Suspected posterior joint loose body and olecranon spurring.  No acute osseous abnormality.      REZA JASON MD       Electronically Signed and Approved By: REZA JASON MD on 7/11/2022 at 16:41                 Assessment & Plan   Diagnoses and all orders for this visit:    1. Moderate episode of recurrent major depressive disorder (HCC) (Primary)    2. Generalized anxiety disorder      Continue Lexapro with benefit to depression and anxiety. Continue hydroxyzine to  target anxiety. Continue brexpiprazole to target depression. 16 minutes of supportive psychotherapy with goal to strengthen defenses, promote problems solving, restore adaptive functioning and provide symptom relief. The therapeutic alliance was strengthened to encourage the patient to express their thoughts and feelings. Esteem building was enhanced through praise, reassurance, normalizing and encouragement. Coping skills were enhanced to build distress tolerance skills and emotional regulation. Allowed patient to freely discuss issues without interruption or judgement with unconditional positive regard, active listening skills, and empathy. Provided a safe, confidential environment to facilitate the development of a positive therapeutic relationship and encourage open, honest communication. Assisted patient in identifying risk factors which would indicate the need for higher level of care including thoughts to harm self or others and/or self-harming behavior and encouraged patient to contact this office, call 911, or present to the nearest emergency room should any of these events occur. Assisted patient in processing session content; acknowledged and normalized patient's thoughts, feelings, and concerns by utilizing a person-centered approach in efforts to build appropriate rapport and a positive therapeutic relationship with open and honest communication. Patient given education on medication side effects, diagnosis/illness and relapse symptoms. Plan to continue supportive psychotherapy in next appointment to provide symptom relief. 4 weeks    Diagnoses: as above  Symptoms: as above  Functional status: good  Mental Status Exam: as above     Treatment plan: medication management and supportive psychotherapy  Prognosis: good  Progress: continued improvement    Visit Diagnoses:    ICD-10-CM ICD-9-CM   1. Moderate episode of recurrent major depressive disorder (HCC)  F33.1 296.32   2. Generalized anxiety disorder   F41.1 300.02       PLAN:  1. Safety: No acute safety concerns.   2. Therapy: Rubina Stocksdale  3. Risk Assessment: Risk of self-harm acutely is moderate.  Risk factors include anxiety disorder, mood disorder, and recent psychosocial stressors (pandemic). Protective factors include no family history, denies access to guns/weapons, no present SI, no history of suicide attempts or self-harm in the past, minimal AODA, healthcare seeking, future orientation, willingness to engage in care.  Risk of self-harm chronically is also moderate, but could be further elevated in the event of treatment noncompliance and/or AODA.  4. Medications: Continue escitalopram 20mg p.o. daily to target depression and anxiety. Risks, benefits, alternatives discussed with patient including GI upset, nausea vomiting diarrhea, theoretical decrease of seizure threshold predisposing the patient to a slightly higher seizure risk, headaches, sexual dysfunction, serotonin syndrome, bleeding risk, increased suicidality in patients 24 years and younger.  After discussion of these risks and benefits, the patient voiced understanding and agreed to proceed. Continue hydroxyzine 25mg po bid prn anxiety. Risks, benefits, alternatives discussed with patient including sedation, dizziness, fall risk, GI upset, and risk of increased CNS depression and elevated heart rate if taken with other antihistamines.  After discussion of these risks and benefits, the patient voiced understanding and agreed to proceed. Continue brexpiprazole 0.5mg po qday to target depression. Risks, benefits, alternatives discussed with patient including increased energy, exacerbation of irritability, akathisia, GI upset, orthostatic hypotension, increased appetite, tardive dyskinesia.  After discussion of these risks and benefits, the patient voiced understanding and agreed to proceed.  5. Labs/studies: No labs/studies ordered at this time  6. Follow-up: 1 month    Patient screened  positive for depression based on a PHQ-9 score of 1 on 10/31/2022. Follow-up recommendations include: Suicide Risk Assessment performed.         TREATMENT PLAN/GOALS: Continue supportive psychotherapy efforts and medications as indicated. Treatment and medication options discussed during today's visit. Patient ackowledged and verbally consented to continue with current treatment plan and was educated on the importance of compliance with treatment and follow-up appointments.      MEDICATION ISSUES:  CRYSTAL reviewed as expected.  Discussed medication options and treatment plan of prescribed medication as well as the risks, benefits, and side effects including potential falls, possible impaired driving and metabolic adversities among others. Patient is agreeable to call the office with any worsening of symptoms or onset of side effects. Patient is agreeable to call 911 or go to the nearest ER should he/she begin having SI/HI. No medication side effects or related complaints today.     MEDS ORDERED DURING VISIT:  No orders of the defined types were placed in this encounter.      Return in about 4 weeks (around 2/6/2023) for Next scheduled follow up.         This document has been electronically signed by VANDANA Chu  January 9, 2023 15:12 EST      Part of this note may be an electronic transcription/translation of spoken language to printed text using the Dragon Dictation System.

## 2023-01-29 DIAGNOSIS — F33.1 MODERATE EPISODE OF RECURRENT MAJOR DEPRESSIVE DISORDER: ICD-10-CM

## 2023-01-30 RX ORDER — ESCITALOPRAM OXALATE 20 MG/1
TABLET ORAL
Qty: 90 TABLET | Refills: 3 | Status: SHIPPED | OUTPATIENT
Start: 2023-01-30

## 2023-01-30 NOTE — TELEPHONE ENCOUNTER
[facility] Protocol Passed 01/29/2023 12:22 PM   Protocol Details  Blood pressure on record in past 15 months    PHQ2 or PHQ9 on record in past 12 months    Recent or future visit with authorizing provider        NEXT APPT WITH PROVIDER  Appointment with Shoaib Jacques APRN (02/07/2023)    LAST MED REFILL  escitalopram (Lexapro) 20 MG tablet (10/24/2022)    PROVIDER PLEASE ADVISE

## 2023-02-03 ENCOUNTER — OFFICE VISIT (OUTPATIENT)
Dept: PSYCHIATRY | Facility: CLINIC | Age: 38
End: 2023-02-03
Payer: COMMERCIAL

## 2023-02-03 DIAGNOSIS — F33.1 MODERATE EPISODE OF RECURRENT MAJOR DEPRESSIVE DISORDER: ICD-10-CM

## 2023-02-03 PROCEDURE — 90837 PSYTX W PT 60 MINUTES: CPT | Performed by: SOCIAL WORKER

## 2023-02-03 NOTE — PROGRESS NOTES
Date: February 3, 2023  Time In: 09:00am   Time Out: 09:57       PROGRESS NOTE  Data:  Tenzin Eastman is a 37 y.o. male who presents today for individual therapy session through [facility] with Rubina Lambert LCSW.       Clinical Maneuvering/Intervention:  Assisted patient in processing  session content; acknowledged and normalized patient’s thoughts, feelings, and concerns.  Rationalized patient thought process regarding depression and irritability **.  Discussed triggers associated with patient's irritability .  Also discussed coping skills for patient to implement such as recognizing triggering  thought patterns .    Allowed patient to freely discuss issues without interruption or judgment. Provided safe, confidential environment to facilitate the development of positive therapeutic relationship and encourage open, honest communication. Assisted patient in identifying risk factors which would indicate the need for higher level of care including thoughts to harm self or others and/or self-harming behavior and encouraged patient to contact this office, call 911, or present to the nearest emergency room should any of these events occur. Discussed crisis intervention services and means to access. Patient adamantly and convincingly denies current suicidal or homicidal ideation or perceptual disturbance.  Therapist provided emotional support and education this date.   Discussed the therapist/patient relationship and explain the parameters and limitations of relative confidentiality.  Also discussed the importance active participation, and honesty to the treatment process.  Encouraged patient to utilize individual sessions to discuss/vent their feelings, frustrations, and fears.      Assessment   Patient appears to maintain relative stability .  However, patient continues to struggle with irritability. which continues to cause impairment in important areas of functioning.  This  primarily shows up with his interactions with his step son. He is about the same age as his biological son that he has no contact with. He recognizes the connection. He reports that the irritability and anger that he has had in the past has been decreased with the current medication regimen. We discussed irritability as a part of his depression and anxiety. The behaviors have in the past caused conflict with his wife This problem is patient's primary concern and goals for therapy are related to these concerns.  He experiences some degree of internal conflict about his lack of contact with his biological son who is 15 and lives with his mother in [place].   As a  result, they can be reasonably be expected to continue to benefit from treatment and would likely be at increased risk for decompensation otherwise.    Mental Status Exam:   Hygiene:   good  Cooperation:  Cooperative  Eye Contact:  Good  Psychomotor Behavior:  Appropriate  Affect:  Restricted  Mood: fluctates  Speech:  Normal  Thought Process:  Goal directed  Thought Content:  Mood congruent  Suicidal:  None  Homicidal:  None  Hallucinations:  None  Delusion:  None  Memory:  Intact  Orientation:  Person, Place, Time and Situation  Reliability:  fair  Insight:  Fair  Judgement:  Fair  Impulse Control:  Fair  Physical/Medical Issues:  No        Patient's Support Network Includes:  wife, parents and extended family    Functional Status: Mild impairment     Progress toward goal: Not at goal             Plan   Challenge approach to step son when needing to redirect him, recognize potential cognitive distortion.s  Coping strategies with irritability   Patient will continue in individual outpatient therapy with focus on improved functioning and coping skills, maintaining stability, and avoiding decompensation and the need for higher level of care.    Patient will adhere to medication regimen as prescribed and report any side effects. Patient will contact this  office, call 911 or present to the nearest emergency room should suicidal or homicidal ideations occur. Provide Cognitive Behavioral Therapy and Solution Focused Therapy to improve functioning, maintain stability, and avoid decompensation and the need for higher level of care.     Return in about 2-3 weeks, or earlier if symptoms worsen or fail to improve.           VISIT DIAGNOSIS:     ICD-10-CM ICD-9-CM   1. Moderate episode of recurrent major depressive disorder (HCC)  F33.1 296.32        This document has been electronically signed by Rubina Lambert LCSW, February 3, 2023, 11:17 EST    Part of this note may be an electronic transcription/translation of spoken language to printed text using the Dragon Dictation System.
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
## 2023-02-07 ENCOUNTER — OFFICE VISIT (OUTPATIENT)
Dept: PSYCHIATRY | Facility: CLINIC | Age: 38
End: 2023-02-07
Payer: COMMERCIAL

## 2023-02-07 VITALS
BODY MASS INDEX: 29.2 KG/M2 | HEIGHT: 76 IN | HEART RATE: 83 BPM | WEIGHT: 239.8 LBS | SYSTOLIC BLOOD PRESSURE: 127 MMHG | DIASTOLIC BLOOD PRESSURE: 74 MMHG

## 2023-02-07 DIAGNOSIS — F41.1 GENERALIZED ANXIETY DISORDER: ICD-10-CM

## 2023-02-07 DIAGNOSIS — F33.1 MODERATE EPISODE OF RECURRENT MAJOR DEPRESSIVE DISORDER: Primary | ICD-10-CM

## 2023-02-07 PROCEDURE — 99214 OFFICE O/P EST MOD 30 MIN: CPT | Performed by: NURSE PRACTITIONER

## 2023-02-07 PROCEDURE — 90833 PSYTX W PT W E/M 30 MIN: CPT | Performed by: NURSE PRACTITIONER

## 2023-02-07 NOTE — PROGRESS NOTES
Subjective   Tenzin Eastman is a 37 y.o. male who presents today for follow up.   This provider is located at [address] [place] [address], [address] in [place], [place]. In-person visit consisting of the patient and I only. The patient is accepting of and agreeable to this appointment.     Chief Complaint:  Depression, anxiety    History of Present Illness:     Depression over the past month  Depressed mood: has improved  Markedly diminished interest or pleasure: n  Significant weight loss when not dieting or weight gain, or decrease or increase in appetite: n  Insomnia or hypersomnia: n  Psychomotor agitation or retardation: n  Fatigue or loss of energy: n  Feelings of worthlessness or excessive or inappropriate guilt: n  Diminished ability to think or concentrate, or indecisiveness: n  Recurrent thoughts of death, recurrent suicidal ideation without a specific plan, or suicide attempt or specific plan for committing suicide- denies    Anxiety over the past month  Anxious, nervous or worried on most days about a number of events or activities- has improved  No control over worries- n- working on positive coping skills  Irritability- denies  Fatigue: see above  Difficulty concentrating- see above  Sleep disturbance- see above  Restlessness- denies  Tension in muscles- denies    Psychiatric Review of Systems: Patient denies any current or previous hallucinations/delusions, paranoia, manic symptoms or PTSD.     PHQ-9 Depression Screening  PHQ-9 Total Score:      Little interest or pleasure in doing things?     Feeling down, depressed, or hopeless?     Trouble falling or staying asleep, or sleeping too much?     Feeling tired or having little energy?     Poor appetite or overeating?     Feeling bad about yourself - or that you are a failure or have let yourself or your family down?     Trouble concentrating on things, such as reading the newspaper or watching television?     Moving or speaking so slowly that other  people could have noticed? Or the opposite - being so fidgety or restless that you have been moving around a lot more than usual?     Thoughts that you would be better off dead, or of hurting yourself in some way?     PHQ-9 Total Score       FAYE-7         Past Surgical History:  History reviewed. No pertinent surgical history.    Problem List:  Patient Active Problem List   Diagnosis   • Varicose veins of lower extremity   • Peripheral venous insufficiency   • Need for hepatitis C screening test   • Viral upper respiratory tract infection   • Hx of anxiety disorder   • Overweight (BMI 25.0-29.9)   • Superficial thrombophlebitis   • Loose body in elbow joint, right       Allergy:   Allergies   Allergen Reactions   • Ibuprofen    • Penicillins         Discontinued Medications:  Medications Discontinued During This Encounter   Medication Reason   • Brexpiprazole 0.5 MG tablet Reorder       Current Medications:   Current Outpatient Medications   Medication Sig Dispense Refill   • Brexpiprazole 0.5 MG tablet Take 0.5 mg by mouth Daily for 90 days. 30 tablet 2   • escitalopram (LEXAPRO) 20 MG tablet TAKE 1 TABLET BY MOUTH DAILY 90 tablet 3     No current facility-administered medications for this visit.       Past Medical History:  Past Medical History:   Diagnosis Date   • Anxiety    • Depression    • Peripheral venous insufficiency     bilateral superficial and deep reflux      • Thrombophlebitis     superficial veins of lower leg - LEFT thigh      • Tobacco dependence syndrome    • Varicose veins of lower extremity     with ulcer AND inflammation - bilateral      • Varicose veins of unspecified lower extremities with other complications     compression stocking       Past Psychiatric History:  Began Treatment: 2018   Diagnoses: Depression, anxiety  Psychiatrist: Previously in [place], [place]  Therapist: Previously in [place], [place]  Admission History: Denies  Medication Trials: Lexapro, wellbutrin, abilify  Self  "Harm: Denies  Suicide Attempts: Denies    Substance Abuse History:   Types: Denies  Withdrawal Symptoms: n/a  Longest Period Sober: n/a  AA: n/a    Social History:  Martial Status:   Employed: [employer]  Kids: Three  House: Lives with wife and children   History: Denies    Social History     Socioeconomic History   • Marital status:    Tobacco Use   • Smoking status: Former     Packs/day: 1.00     Years: 2.00     Pack years: 2.00     Types: Cigarettes     Start date: 1/1/2000     Quit date: 1/1/2020     Years since quitting: 3.1   • Smokeless tobacco: Never   Vaping Use   • Vaping Use: Every day   • Substances: THC   • Devices: Pre-filled or refillable cartridge, Pre-filled pod   Substance and Sexual Activity   • Alcohol use: Not Currently     Alcohol/week: 3.0 standard drinks     Types: 3 Cans of beer per week   • Drug use: Yes     Types: Marijuana   • Sexual activity: Yes       Family History:   Suicide Attempts: Denies  Suicide Completions: Denies      Family History   Problem Relation Age of Onset   • Depression Mother    • No Known Problems Father    • Dementia Maternal Grandmother    • Depression Other    • Hypertension Other        Developmental History:   Born: [place]  Siblings:Two sisters  Childhood: Denies childhood abuse  High School: Graduate  College: Denies    Access to Firearms: Denies    Mental Status Exam:     Appearance: good eye contact, normal street clothes, groomed, sitting in chair   Behavior: pleasant and cooperative  Motor: no abnormal  Speech: normal rhythm, rate, volume, tone, not hyperverbal, not pressured, normal prosidy  Mood: \"Okay\"  Affect: euthymic  Thought Content: negative suicidal ideations, negative homicidal ideations, negative obsessions  Perceptions: negative auditory hallucinations, negative visual hallucinations, negative delusions, negative paranoia  Thought Process: goal directed, linear  Insight/Judgement: fair/fair  Cognition: grossly " "intact  Attention: intact  Orientation: person, place, time and situation  Memory: intact    Review of Systems:     Constitutional: Denies fatigue, night sweats  Eyes: Denies double vision, blurred vision  HENT: Denies vertigo, recent head injury  Cardiovascular: Denies chest pain, irregular heartbeats  Respiratory: Denies productive cough, shortness of breath  Gastrointestinal: Denies nausea, vomiting  Genitourinary: Denies dysuria, urinary retention  Integument: Denies hair growth change, new skin lesions  Neurologic: Denies altered mental status, seizures  Musculoskeletal: Denies joint swelling, limitation of motion  Endocrine: Denies cold intolerance, heat intolerance  Psychiatric: Admits anxiety, depression.  Denies psychosis, graciela, post-traumatic stress disorder, obsessive compulsive disorder.   Allergic-immunologic: Denies frequent illnesses      Vital Signs:   /74   Pulse 83   Ht 193 cm (76\")   Wt 109 kg (239 lb 12.8 oz)   BMI 29.19 kg/m²      Lab Results:   Office Visit on 02/22/2022   Component Date Value Ref Range Status   • SARS Antigen 02/22/2022 Not Detected  Not Detected Final   • Internal Control 02/22/2022 Passed  Passed Final   • Lot Number 02/22/2022 [id number]   Final   • Expiration Date 02/22/2022 [id number]   Final       EKG Results:  No orders to display       Imaging Results:  XR Elbow 3+ View Right    Result Date: 7/11/2022   Suspected posterior joint loose body and olecranon spurring.  No acute osseous abnormality.      REZA JASON MD       Electronically Signed and Approved By: REZA JASON MD on 7/11/2022 at 16:41                 Assessment & Plan   Diagnoses and all orders for this visit:    1. Moderate episode of recurrent major depressive disorder (HCC) (Primary)  -     Brexpiprazole 0.5 MG tablet; Take 0.5 mg by mouth Daily for 90 days.  Dispense: 30 tablet; Refill: 2    2. Generalized anxiety disorder  -     Brexpiprazole 0.5 MG tablet; Take 0.5 mg by mouth Daily for 90 " days.  Dispense: 30 tablet; Refill: 2      Continue Lexapro with benefit to depression and anxiety. Continue hydroxyzine to target anxiety. Continue brexpiprazole to target depression. 16 minutes of supportive psychotherapy with goal to strengthen defenses, promote problems solving, restore adaptive functioning and provide symptom relief. The therapeutic alliance was strengthened to encourage the patient to express their thoughts and feelings. Esteem building was enhanced through praise, reassurance, normalizing and encouragement. Coping skills were enhanced to build distress tolerance skills and emotional regulation. Allowed patient to freely discuss issues without interruption or judgement with unconditional positive regard, active listening skills, and empathy. Provided a safe, confidential environment to facilitate the development of a positive therapeutic relationship and encourage open, honest communication. Assisted patient in identifying risk factors which would indicate the need for higher level of care including thoughts to harm self or others and/or self-harming behavior and encouraged patient to contact this office, call 911, or present to the nearest emergency room should any of these events occur. Assisted patient in processing session content; acknowledged and normalized patient's thoughts, feelings, and concerns by utilizing a person-centered approach in efforts to build appropriate rapport and a positive therapeutic relationship with open and honest communication. Patient given education on medication side effects, diagnosis/illness and relapse symptoms. Plan to continue supportive psychotherapy in next appointment to provide symptom relief. 4 weeks    Diagnoses: as above  Symptoms: as above  Functional status: good  Mental Status Exam: as above     Treatment plan: medication management and supportive psychotherapy  Prognosis: good  Progress: continued improvement    Visit Diagnoses:    ICD-10-CM  ICD-9-CM   1. Moderate episode of recurrent major depressive disorder (HCC)  F33.1 296.32   2. Generalized anxiety disorder  F41.1 300.02       PLAN:  1. Safety: No acute safety concerns.   2. Therapy: Rubina Stocksdale  3. Risk Assessment: Risk of self-harm acutely is moderate.  Risk factors include anxiety disorder, mood disorder, and recent psychosocial stressors (pandemic). Protective factors include no family history, denies access to guns/weapons, no present SI, no history of suicide attempts or self-harm in the past, minimal AODA, healthcare seeking, future orientation, willingness to engage in care.  Risk of self-harm chronically is also moderate, but could be further elevated in the event of treatment noncompliance and/or AODA.  4. Medications: Continue escitalopram 20mg p.o. daily to target depression and anxiety. Risks, benefits, alternatives discussed with patient including GI upset, nausea vomiting diarrhea, theoretical decrease of seizure threshold predisposing the patient to a slightly higher seizure risk, headaches, sexual dysfunction, serotonin syndrome, bleeding risk, increased suicidality in patients 24 years and younger.  After discussion of these risks and benefits, the patient voiced understanding and agreed to proceed. Continue hydroxyzine 25mg po bid prn anxiety. Risks, benefits, alternatives discussed with patient including sedation, dizziness, fall risk, GI upset, and risk of increased CNS depression and elevated heart rate if taken with other antihistamines.  After discussion of these risks and benefits, the patient voiced understanding and agreed to proceed. Continue brexpiprazole 0.5mg po qday to target depression. Risks, benefits, alternatives discussed with patient including increased energy, exacerbation of irritability, akathisia, GI upset, orthostatic hypotension, increased appetite, tardive dyskinesia.  After discussion of these risks and benefits, the patient voiced understanding  and agreed to proceed.  5. Labs/studies: No labs/studies ordered at this time  6. Follow-up: 1 month    Patient screened positive for depression based on a PHQ-9 score of 1 on 10/31/2022. Follow-up recommendations include: Suicide Risk Assessment performed.         TREATMENT PLAN/GOALS: Continue supportive psychotherapy efforts and medications as indicated. Treatment and medication options discussed during today's visit. Patient ackowledged and verbally consented to continue with current treatment plan and was educated on the importance of compliance with treatment and follow-up appointments.      MEDICATION ISSUES:  CRYSTAL reviewed as expected.  Discussed medication options and treatment plan of prescribed medication as well as the risks, benefits, and side effects including potential falls, possible impaired driving and metabolic adversities among others. Patient is agreeable to call the office with any worsening of symptoms or onset of side effects. Patient is agreeable to call 911 or go to the nearest ER should he/she begin having SI/HI. No medication side effects or related complaints today.     MEDS ORDERED DURING VISIT:  New Medications Ordered This Visit   Medications   • Brexpiprazole 0.5 MG tablet     Sig: Take 0.5 mg by mouth Daily for 90 days.     Dispense:  30 tablet     Refill:  2       Return in about 4 weeks (around 3/7/2023).         This document has been electronically signed by VANDANA Chu  February 7, 2023 14:59 EST      Part of this note may be an electronic transcription/translation of spoken language to printed text using the Dragon Dictation System.

## 2023-03-15 ENCOUNTER — DOCUMENTATION (OUTPATIENT)
Dept: PSYCHIATRY | Facility: CLINIC | Age: 38
End: 2023-03-15

## 2023-03-15 ENCOUNTER — OFFICE VISIT (OUTPATIENT)
Dept: PSYCHIATRY | Facility: CLINIC | Age: 38
End: 2023-03-15
Payer: COMMERCIAL

## 2023-03-15 DIAGNOSIS — F33.1 MAJOR DEPRESSIVE DISORDER, RECURRENT EPISODE, MODERATE: ICD-10-CM

## 2023-03-15 DIAGNOSIS — Z86.59 HX OF ANXIETY DISORDER: ICD-10-CM

## 2023-03-15 PROCEDURE — 90837 PSYTX W PT 60 MINUTES: CPT | Performed by: SOCIAL WORKER

## 2023-03-15 NOTE — PROGRESS NOTES
Progress Note    Date: 03/15/2023  Time In: 10:55  Time Out: 11:50    Patient Legal Name: Tenzin Eastman  Patient Age: 37 y.o.    CHIEF COMPLAINT: Depression and Anger *    Subjective   History of Present Illness         Tenzin is a 37 y.o. male who presents today as a follow-up for continued psychotherapy. Our previous therapy session was on 02-03-23, goals focused on recognizing cognitive distortions when dealing with issues with step son.       Assessment    Mental Status Exam     Appearance: good hygiene and dressed appropriately for the weather  Behavior: calm  Cooperation:  engaged, cooperative, attentive, and friendly  Eye Contact:  good  Affect:  congruent  Mood: expressive  Speech: responsive and slow  Thought Process:  linear  Thought Content: appropriate  Suicidal: denies  Homicidal:  denies  Hallucinations:  denies  Memory:  intact  Orientation:  person, place, time, and situation  Reliability:  reliable  Insight:  good  Judgment:  good     Clinical Intervention       ICD-10-CM ICD-9-CM   1. Major depressive disorder, recurrent episode, moderate ([facility])  F33.1 296.32   2. Hx of anxiety disorder  Z86.59 V11.8        Individual psychotherapy was provided utilizing solution focused  techniques to promote problem-solving, encourage expression of thoughts and feelings, discuss values and strengths, develop healthy communication skills, manage stress, identify triggers, recognize patterns of behavior and acknowledge sources of feelings and behaviors. Tenzin reports that medication to treat depression is effective, recent medication management appointment with no changes. Anxiety is effectively being treated as well.  Tenzin states that his main concern is his anger in dealing with step son. His wife feels that this is a problem. Discussed that Tenzin feels like he has difficulty feeling is emotions, reacts and then shuts down. Discussed dynamics that step son may be reacting to. Discussed ways that Tenzin can  connect with him, also make an effort to connect Anuel with his friends. Discussed coping trategies for dealing with episodes of anger. Also encouraged further exploration into feelings beneath anger.     Plan   Plan & Goals     Communicate and connect with Anuel  Try coping strategy of walking and taking a break when feelings of anger surface.     1. Patient acknowledged and verbally consented to continue working toward resolving current treatment plan goals and was educated on the importance of participation in the therapeutic process.  2. Patient will remain compliant with medication regimen as prescribed. Discuss any medication side effects, questions or concerns with prescribing provider.  3. Call 911 or present to the nearest emergency room in an emergency situation.  4. National Suicide Prevention Lifeline: Call 988. The Lifeline provides 24/7, free and confidential support for people in distress, prevention and crisis resources.    No follow-ups on file.    ____________________  This document has been electronically signed by Rubina Lambert LCSW  March 15, 2023 12:00 EDT    Part of this note may be an electronic transcription/translation of spoken language to printed text using the Dragon Dictation System.

## 2023-03-15 NOTE — PSYCHOTHERAPY NOTE
meds doing well   I need to work on my anger     Towards Anuel, I might have gone overboard   Raise my voice, we are both stubborn  This may happen Once every one or two months   I feel disrespected   [place]     Anuel may be Blaming Tenzin for lack of access to friends  Making plans for his birthday in October with friends   Play video games, maybe once a week   Its been a while since he saw his dad    I think I am doing better  El and Anuel in my mind   Taking a step back   Frustration and anger at myself over El,   When I get angry, I shut down, The house could be on fire I would not move    I was alone as a kid, nobody showd me how to deal with emotions   Sometimes its been all day, does not take me as long, maybe an hour   Walking to direct anger and process    Hang out with friends, from work    [place] going to [place] in May Heavy metal     Talk to Anuel , recognize gave up friends, connect with a friend,  Or go to [facility] with friend, make effort   Anger- walking, processing,  What might be underneath the anger - hopefully coming back and not being shut down,

## 2023-04-19 ENCOUNTER — OFFICE VISIT (OUTPATIENT)
Dept: PSYCHIATRY | Facility: CLINIC | Age: 38
End: 2023-04-19
Payer: COMMERCIAL

## 2023-04-19 DIAGNOSIS — F32.9 MAJOR DEPRESSIVE DISORDER, REMISSION STATUS UNSPECIFIED, UNSPECIFIED WHETHER RECURRENT: ICD-10-CM

## 2023-04-19 NOTE — PROGRESS NOTES
Progress Note    Date: 04/19/2023  Time In: 9:00am   Time Out: 09:53am     Patient Legal Name: Tenzin Eastman  Patient Age: 38 y.o.    CHIEF COMPLAINT: Depression     Subjective   History of Present Illness         Tenzin is a 38 y.o. male who presents today as a follow-up for continued psychotherapy. Our last session for therapy was on 02/03/23 and at that time our goals focused on coping strategies and challenge approaches to interactions with step son.        Assessment    Mental Status Exam     Appearance: good hygiene and dressed appropriately for the weather  Behavior: calm  Cooperation:  engaged, cooperative, attentive, and friendly  Eye Contact:  good  Affect:  congruent  Mood: expressive  Speech: responsive  Thought Process:  goal-oriented  Thought Content: appropriate  Suicidal: denies  Homicidal:  denies  Hallucinations:  denies  Memory:  intact  Orientation:  person, place, time, and situation  Reliability:  reliable  Insight:  good  Judgment:  good     Clinical Intervention       ICD-10-CM ICD-9-CM   1. Major depressive disorder, remission status unspecified, unspecified whether recurrent  F32.9 296.20        Individual psychotherapy was provided utilizing solution focused  techniques to promote problem-solving, encourage expression of thoughts and feelings, support self-esteem, develop healthy communication skills, manage stress, identify triggers, recognize patterns of behavior, acknowledge sources of feelings and behaviors and build confidence. Tenzin reports that he is doing very well, medication to target depression have been very effective. He has been able to improve communication with his wife and step son. He was able to discuss his feelings and they were able to come to a solution without him compromising or getting angry. He feels that he has improved greatly with treatment.  Plan   Plan & Goals     Practice effective communication to strengthen relationships and build self esteem and  confidence.   Work on personal goals, exercise, weight loss, healthy lifestyle choices.     1. Patient acknowledged and verbally consented to continue working toward resolving current treatment plan goals and was educated on the importance of participation in the therapeutic process.  2. Patient will remain compliant with medication regimen as prescribed. Discuss any medication side effects, questions or concerns with prescribing provider.  3. Call 911 or present to the nearest emergency room in an emergency situation.  4. National Suicide Prevention Lifeline: Call 988. The Lifeline provides 24/7, free and confidential support for people in distress, prevention and crisis resources.    Return in about 4 weeks (around 5/17/2023).    ____________________  This document has been electronically signed by Rubina Lambert LCSW  April 19, 2023 10:12 EDT    Part of this note may be an electronic transcription/translation of spoken language to printed text using the Dragon Dictation System.        Detail Level: Detailed

## 2023-04-19 NOTE — PSYCHOTHERAPY NOTE
Light box   Work good,  Wife wants me to find better paying job  Work stress I  Low  Overtime - will do it,  1-2 hours   I was able to communicate  To her- I would normally give in and do what she   Abundio,   abundio went with [place] to [place] on spring break    I dont react, think about it, how big of a deal is it?   Think about options   meds help, [facility] -    [place]- with dad,   Exercise more- maybe an hour or so 3-4 times week,  Push up , 30-60 min    I used to have a routine, hurt myseld,  Threw me off     Want to work on on, struggle with diet - [employer],  Got to 200, only time comfortable in my own skin, want to get back  Intermittent Fasting     Depressed for 10 years,  Worked and ate, slept,   Mom noticed and got help, different

## 2024-08-06 ENCOUNTER — LAB (OUTPATIENT)
Dept: LAB | Facility: HOSPITAL | Age: 39
End: 2024-08-06
Payer: COMMERCIAL

## 2024-08-06 ENCOUNTER — OFFICE VISIT (OUTPATIENT)
Dept: FAMILY MEDICINE CLINIC | Facility: CLINIC | Age: 39
End: 2024-08-06
Payer: COMMERCIAL

## 2024-08-06 VITALS
DIASTOLIC BLOOD PRESSURE: 97 MMHG | BODY MASS INDEX: 24.4 KG/M2 | WEIGHT: 200.4 LBS | HEART RATE: 83 BPM | TEMPERATURE: 98.4 F | OXYGEN SATURATION: 99 % | SYSTOLIC BLOOD PRESSURE: 143 MMHG | HEIGHT: 76 IN

## 2024-08-06 DIAGNOSIS — I83.90 VARICOSE VEINS OF LOWER EXTREMITY, UNSPECIFIED LATERALITY, UNSPECIFIED WHETHER COMPLICATED: ICD-10-CM

## 2024-08-06 DIAGNOSIS — I10 ESSENTIAL HYPERTENSION: ICD-10-CM

## 2024-08-06 DIAGNOSIS — Z11.59 NEED FOR HEPATITIS C SCREENING TEST: ICD-10-CM

## 2024-08-06 DIAGNOSIS — Z13.220 SCREENING FOR LIPID DISORDERS: ICD-10-CM

## 2024-08-06 DIAGNOSIS — Z00.00 ANNUAL PHYSICAL EXAM: Primary | ICD-10-CM

## 2024-08-06 DIAGNOSIS — Z13.1 SCREENING FOR DIABETES MELLITUS: ICD-10-CM

## 2024-08-06 DIAGNOSIS — Z13.29 SCREENING FOR THYROID DISORDER: ICD-10-CM

## 2024-08-06 DIAGNOSIS — I87.2 PERIPHERAL VENOUS INSUFFICIENCY: ICD-10-CM

## 2024-08-06 DIAGNOSIS — L98.9 SKIN LESION: ICD-10-CM

## 2024-08-06 DIAGNOSIS — Z00.00 ANNUAL PHYSICAL EXAM: ICD-10-CM

## 2024-08-06 DIAGNOSIS — I80.02 THROMBOPHLEBITIS OF SUPERFICIAL VEINS OF LEFT LOWER EXTREMITY: ICD-10-CM

## 2024-08-06 PROBLEM — J06.9 VIRAL UPPER RESPIRATORY TRACT INFECTION: Status: RESOLVED | Noted: 2022-02-22 | Resolved: 2024-08-06

## 2024-08-06 LAB
ALBUMIN SERPL-MCNC: 4.6 G/DL (ref 3.5–5.2)
ALBUMIN/GLOB SERPL: 1.8 G/DL
ALP SERPL-CCNC: 59 U/L (ref 39–117)
ALT SERPL W P-5'-P-CCNC: 12 U/L (ref 1–41)
ANION GAP SERPL CALCULATED.3IONS-SCNC: 8.8 MMOL/L (ref 5–15)
AST SERPL-CCNC: 16 U/L (ref 1–40)
BASOPHILS # BLD AUTO: 0.04 10*3/MM3 (ref 0–0.2)
BASOPHILS NFR BLD AUTO: 0.9 % (ref 0–1.5)
BILIRUB SERPL-MCNC: 0.9 MG/DL (ref 0–1.2)
BUN SERPL-MCNC: 14 MG/DL (ref 6–20)
BUN/CREAT SERPL: 13 (ref 7–25)
CALCIUM SPEC-SCNC: 10.1 MG/DL (ref 8.6–10.5)
CHLORIDE SERPL-SCNC: 102 MMOL/L (ref 98–107)
CHOLEST SERPL-MCNC: 175 MG/DL (ref 0–200)
CO2 SERPL-SCNC: 28.2 MMOL/L (ref 22–29)
CREAT SERPL-MCNC: 1.08 MG/DL (ref 0.76–1.27)
DEPRECATED RDW RBC AUTO: 44.7 FL (ref 37–54)
EGFRCR SERPLBLD CKD-EPI 2021: 89 ML/MIN/1.73
EOSINOPHIL # BLD AUTO: 0.12 10*3/MM3 (ref 0–0.4)
EOSINOPHIL NFR BLD AUTO: 2.7 % (ref 0.3–6.2)
ERYTHROCYTE [DISTWIDTH] IN BLOOD BY AUTOMATED COUNT: 12.8 % (ref 12.3–15.4)
GLOBULIN UR ELPH-MCNC: 2.6 GM/DL
GLUCOSE SERPL-MCNC: 92 MG/DL (ref 65–99)
HBA1C MFR BLD: 5.2 % (ref 4.8–5.6)
HCT VFR BLD AUTO: 47.3 % (ref 37.5–51)
HCV AB SER QL: NORMAL
HDLC SERPL-MCNC: 47 MG/DL (ref 40–60)
HGB BLD-MCNC: 15.5 G/DL (ref 13–17.7)
IMM GRANULOCYTES # BLD AUTO: 0.01 10*3/MM3 (ref 0–0.05)
IMM GRANULOCYTES NFR BLD AUTO: 0.2 % (ref 0–0.5)
LDLC SERPL CALC-MCNC: 113 MG/DL (ref 0–100)
LDLC/HDLC SERPL: 2.39 {RATIO}
LYMPHOCYTES # BLD AUTO: 1.43 10*3/MM3 (ref 0.7–3.1)
LYMPHOCYTES NFR BLD AUTO: 32.4 % (ref 19.6–45.3)
MCH RBC QN AUTO: 31.4 PG (ref 26.6–33)
MCHC RBC AUTO-ENTMCNC: 32.8 G/DL (ref 31.5–35.7)
MCV RBC AUTO: 95.7 FL (ref 79–97)
MONOCYTES # BLD AUTO: 0.32 10*3/MM3 (ref 0.1–0.9)
MONOCYTES NFR BLD AUTO: 7.2 % (ref 5–12)
NEUTROPHILS NFR BLD AUTO: 2.5 10*3/MM3 (ref 1.7–7)
NEUTROPHILS NFR BLD AUTO: 56.6 % (ref 42.7–76)
NRBC BLD AUTO-RTO: 0 /100 WBC (ref 0–0.2)
PLATELET # BLD AUTO: 208 10*3/MM3 (ref 140–450)
PMV BLD AUTO: 9.5 FL (ref 6–12)
POTASSIUM SERPL-SCNC: 5 MMOL/L (ref 3.5–5.2)
PROT SERPL-MCNC: 7.2 G/DL (ref 6–8.5)
RBC # BLD AUTO: 4.94 10*6/MM3 (ref 4.14–5.8)
SODIUM SERPL-SCNC: 139 MMOL/L (ref 136–145)
TRIGL SERPL-MCNC: 79 MG/DL (ref 0–150)
TSH SERPL DL<=0.05 MIU/L-ACNC: 1.85 UIU/ML (ref 0.27–4.2)
VLDLC SERPL-MCNC: 15 MG/DL (ref 5–40)
WBC NRBC COR # BLD AUTO: 4.42 10*3/MM3 (ref 3.4–10.8)

## 2024-08-06 PROCEDURE — 80061 LIPID PANEL: CPT

## 2024-08-06 PROCEDURE — 86803 HEPATITIS C AB TEST: CPT

## 2024-08-06 PROCEDURE — 83036 HEMOGLOBIN GLYCOSYLATED A1C: CPT

## 2024-08-06 PROCEDURE — 80050 GENERAL HEALTH PANEL: CPT

## 2024-08-06 PROCEDURE — 99214 OFFICE O/P EST MOD 30 MIN: CPT | Performed by: NURSE PRACTITIONER

## 2024-08-06 PROCEDURE — 99396 PREV VISIT EST AGE 40-64: CPT | Performed by: NURSE PRACTITIONER

## 2024-08-06 PROCEDURE — 36415 COLL VENOUS BLD VENIPUNCTURE: CPT

## 2024-08-06 RX ORDER — NAPROXEN 500 MG/1
500 TABLET ORAL 2 TIMES DAILY WITH MEALS
Qty: 60 TABLET | Refills: 2 | Status: SHIPPED | OUTPATIENT
Start: 2024-08-06

## 2024-08-06 RX ORDER — SULFAMETHOXAZOLE AND TRIMETHOPRIM 800; 160 MG/1; MG/1
1 TABLET ORAL 2 TIMES DAILY
Qty: 20 TABLET | Refills: 0 | Status: SHIPPED | OUTPATIENT
Start: 2024-08-06 | End: 2024-08-16

## 2024-08-06 NOTE — ASSESSMENT & PLAN NOTE
Hypertension is uncontrolled  Regular aerobic exercise.  Ambulatory blood pressure monitoring.  Blood pressure will be reassessedin 4 weeks. Pt wishes to CTM at this time.

## 2024-08-06 NOTE — PROGRESS NOTES
"Chief Complaint  Leg Injury (Left leg, sore that aren't healing been there for several months )    Subjective        Tenzin Eastman presents to [facility]  History of Present Illness  Pt presents c/o lower left leg lesion that has been present for several months. Has not taken anything to treat. Pt with PVD, superficial thrombophlebitis. Denies current leg pain, swelling, or warmth. States he does wear compression stockings. Works at [employer] and states he is on his feet all day. Denies SOB, chest pain, dizziness, HA or other.     Reviewed all recent labs and medications.      Objective   Vital Signs:  /97 (BP Location: Left arm, Patient Position: Sitting, Cuff Size: Adult)   Pulse 83   Temp 98.4 °F (36.9 °C)   Ht 193 cm (76\")   Wt 90.9 kg (200 lb 6.4 oz)   SpO2 99%   BMI 24.39 kg/m²   Estimated body mass index is 24.39 kg/m² as calculated from the following:    Height as of this encounter: 193 cm (76\").    Weight as of this encounter: 90.9 kg (200 lb 6.4 oz).       BMI is within normal parameters. No other follow-up for BMI required.      Physical Exam  Vitals reviewed.   Constitutional:       General: He is not in acute distress.  HENT:      Head: Normocephalic.      Right Ear: Tympanic membrane normal.      Left Ear: Tympanic membrane normal.      Nose: Nose normal.      Mouth/Throat:      Pharynx: Oropharynx is clear. No posterior oropharyngeal erythema.   Eyes:      General: No scleral icterus.     Extraocular Movements: Extraocular movements intact.      Conjunctiva/sclera: Conjunctivae normal.      Pupils: Pupils are equal, round, and reactive to light.   Cardiovascular:      Rate and Rhythm: Normal rate and regular rhythm.      Pulses: Normal pulses.      Heart sounds: Normal heart sounds.      Comments: Multiple bilat lower extremity prominent varicosities  Pulmonary:      Effort: Pulmonary effort is normal.      Breath sounds: Normal breath sounds. "   Abdominal:      General: Bowel sounds are normal.      Palpations: Abdomen is soft.   Musculoskeletal:         General: Normal range of motion.      Cervical back: Neck supple.   Skin:     General: Skin is warm and dry.      Findings: Lesion present.      Comments: L talus surface lesion, scabbing, no discharge    Neurological:      Mental Status: He is alert and oriented to person, place, and time.   Psychiatric:         Mood and Affect: Mood normal.         Behavior: Behavior normal.         Thought Content: Thought content normal.         Judgment: Judgment normal.        Result Review :        Data reviewed : Consultant notes psych, behavorial health              Assessment and Plan     Diagnoses and all orders for this visit:    1. Annual physical exam (Primary)  Assessment & Plan:  Discussed age appropriate preventative counseling including all recommended screenings and immunizations, sunscreen, and seatbelt use. Written information provided to patient. All questions answered. Pt verbalized understanding.       Orders:  -     CBC & Differential; Future  -     Comprehensive Metabolic Panel; Future    2. Need for hepatitis C screening test  -     Hepatitis C Antibody; Future    3. Screening for lipid disorders  -     Lipid Panel; Future    4. Screening for thyroid disorder  -     TSH; Future    5. Screening for diabetes mellitus  -     Hemoglobin A1c; Future    6. Varicose veins of lower extremity, unspecified laterality, unspecified whether complicated  -     Duplex Venous Lower Extremity - Bilateral CAR; Future    7. Thrombophlebitis of superficial veins of left lower extremity  -     Ambulatory Referral to Vascular Surgery    8. Peripheral venous insufficiency  Assessment & Plan:  Referral to vascular   Bilat venous dopplar US  Continue to wear compression stockings daily   Elevation of extremities   Naproxen 500mg PO bid     Orders:  -     Ambulatory Referral to Vascular Surgery    9. Essential  hypertension  Assessment & Plan:  Hypertension is uncontrolled  Regular aerobic exercise.  Ambulatory blood pressure monitoring.  Blood pressure will be reassessedin 4 weeks. Pt wishes to CTM at this time.       10. Skin lesion  Comments:  bactrim 1 tab PO bid x 10 days   CTM, notify if s/s worsen or persist despite treatment    Other orders  -     naproxen (Naprosyn) 500 MG tablet; Take 1 tablet by mouth 2 (Two) Times a Day With Meals.  Dispense: 60 tablet; Refill: 2  -     sulfamethoxazole-trimethoprim (Bactrim DS) 800-160 MG per tablet; Take 1 tablet by mouth 2 (Two) Times a Day for 10 days.  Dispense: 20 tablet; Refill: 0             Follow Up     Return if symptoms worsen or fail to improve.  Patient was given instructions and counseling regarding his condition or for health maintenance advice. Please see specific information pulled into the AVS if appropriate.

## 2024-08-06 NOTE — ASSESSMENT & PLAN NOTE
Referral to vascular   Bilat venous dopplar US  Continue to wear compression stockings daily   Elevation of extremities   Naproxen 500mg PO bid

## 2024-09-09 ENCOUNTER — HOSPITAL ENCOUNTER (OUTPATIENT)
Dept: CARDIOLOGY | Facility: HOSPITAL | Age: 39
Discharge: HOME | End: 2024-09-09
Admitting: NURSE PRACTITIONER
Payer: COMMERCIAL

## 2024-09-09 DIAGNOSIS — I83.90 VARICOSE VEINS OF LOWER EXTREMITY, UNSPECIFIED LATERALITY, UNSPECIFIED WHETHER COMPLICATED: ICD-10-CM

## 2024-09-09 LAB
BH CV LOW VAS LEFT SAPHENOFEMORAL JUNCTION SPONT: 1
BH CV LOW VAS LEFT VARICOSITY AK VESSEL: 1
BH CV LOW VAS LEFT VARICOSITY BK VESSEL: 1
BH CV LOW VAS RIGHT SAPHENOFEMORAL JUNCTION SPONT: 1
BH CV LOWER VASCULAR LEFT COMMON FEMORAL AUGMENT: NORMAL
BH CV LOWER VASCULAR LEFT COMMON FEMORAL COMPETENT: NORMAL
BH CV LOWER VASCULAR LEFT COMMON FEMORAL COMPRESS: NORMAL
BH CV LOWER VASCULAR LEFT COMMON FEMORAL PHASIC: NORMAL
BH CV LOWER VASCULAR LEFT COMMON FEMORAL SPONT: NORMAL
BH CV LOWER VASCULAR LEFT DISTAL FEMORAL COMPRESS: NORMAL
BH CV LOWER VASCULAR LEFT GASTRONEMIUS COMPRESS: NORMAL
BH CV LOWER VASCULAR LEFT GREATER SAPH AK COMPRESS: NORMAL
BH CV LOWER VASCULAR LEFT GREATER SAPH BK COMPRESS: NORMAL
BH CV LOWER VASCULAR LEFT LESSER SAPH COMPRESS: NORMAL
BH CV LOWER VASCULAR LEFT MID FEMORAL AUGMENT: NORMAL
BH CV LOWER VASCULAR LEFT MID FEMORAL COMPETENT: NORMAL
BH CV LOWER VASCULAR LEFT MID FEMORAL COMPRESS: NORMAL
BH CV LOWER VASCULAR LEFT MID FEMORAL PHASIC: NORMAL
BH CV LOWER VASCULAR LEFT MID FEMORAL SPONT: NORMAL
BH CV LOWER VASCULAR LEFT PERONEAL COMPRESS: NORMAL
BH CV LOWER VASCULAR LEFT POPLITEAL AUGMENT: NORMAL
BH CV LOWER VASCULAR LEFT POPLITEAL COMPETENT: NORMAL
BH CV LOWER VASCULAR LEFT POPLITEAL COMPRESS: NORMAL
BH CV LOWER VASCULAR LEFT POPLITEAL PHASIC: NORMAL
BH CV LOWER VASCULAR LEFT POPLITEAL SPONT: NORMAL
BH CV LOWER VASCULAR LEFT POSTERIOR TIBIAL COMPRESS: NORMAL
BH CV LOWER VASCULAR LEFT PROFUNDA FEMORAL COMPRESS: NORMAL
BH CV LOWER VASCULAR LEFT PROXIMAL FEMORAL COMPRESS: NORMAL
BH CV LOWER VASCULAR LEFT SAPHENOFEMORAL JUNCTION COMPETENT: NORMAL
BH CV LOWER VASCULAR LEFT SAPHENOFEMORAL JUNCTION COMPRESS: NORMAL
BH CV LOWER VASCULAR LEFT VARICOSITY AK COMPRESS: NORMAL
BH CV LOWER VASCULAR LEFT VARICOSITY AK THROMBUS: NORMAL
BH CV LOWER VASCULAR LEFT VARICOSITY BK COMPRESS: NORMAL
BH CV LOWER VASCULAR LEFT VARICOSITY BK THROMBUS: NORMAL
BH CV LOWER VASCULAR RIGHT COMMON FEMORAL AUGMENT: NORMAL
BH CV LOWER VASCULAR RIGHT COMMON FEMORAL COMPETENT: NORMAL
BH CV LOWER VASCULAR RIGHT COMMON FEMORAL COMPRESS: NORMAL
BH CV LOWER VASCULAR RIGHT COMMON FEMORAL PHASIC: NORMAL
BH CV LOWER VASCULAR RIGHT COMMON FEMORAL SPONT: NORMAL
BH CV LOWER VASCULAR RIGHT DISTAL FEMORAL COMPRESS: NORMAL
BH CV LOWER VASCULAR RIGHT GASTRONEMIUS COMPRESS: NORMAL
BH CV LOWER VASCULAR RIGHT GREATER SAPH AK COMPRESS: NORMAL
BH CV LOWER VASCULAR RIGHT GREATER SAPH BK COMPRESS: NORMAL
BH CV LOWER VASCULAR RIGHT LESSER SAPH COMPRESS: NORMAL
BH CV LOWER VASCULAR RIGHT MID FEMORAL AUGMENT: NORMAL
BH CV LOWER VASCULAR RIGHT MID FEMORAL COMPETENT: NORMAL
BH CV LOWER VASCULAR RIGHT MID FEMORAL COMPRESS: NORMAL
BH CV LOWER VASCULAR RIGHT MID FEMORAL PHASIC: NORMAL
BH CV LOWER VASCULAR RIGHT MID FEMORAL SPONT: NORMAL
BH CV LOWER VASCULAR RIGHT PERONEAL COMPRESS: NORMAL
BH CV LOWER VASCULAR RIGHT POPLITEAL AUGMENT: NORMAL
BH CV LOWER VASCULAR RIGHT POPLITEAL COMPETENT: NORMAL
BH CV LOWER VASCULAR RIGHT POPLITEAL COMPRESS: NORMAL
BH CV LOWER VASCULAR RIGHT POPLITEAL PHASIC: NORMAL
BH CV LOWER VASCULAR RIGHT POPLITEAL SPONT: NORMAL
BH CV LOWER VASCULAR RIGHT POSTERIOR TIBIAL COMPRESS: NORMAL
BH CV LOWER VASCULAR RIGHT PROXIMAL FEMORAL COMPRESS: NORMAL
BH CV LOWER VASCULAR RIGHT SAPHENOFEMORAL JUNCTION COMPETENT: NORMAL
BH CV LOWER VASCULAR RIGHT SAPHENOFEMORAL JUNCTION COMPRESS: NORMAL

## 2024-09-09 PROCEDURE — 93970 EXTREMITY STUDY: CPT | Performed by: SURGERY

## 2024-09-09 PROCEDURE — 93970 EXTREMITY STUDY: CPT

## 2024-09-16 ENCOUNTER — OFFICE VISIT (OUTPATIENT)
Dept: FAMILY MEDICINE CLINIC | Facility: CLINIC | Age: 39
End: 2024-09-16
Payer: COMMERCIAL

## 2024-09-16 VITALS
HEART RATE: 71 BPM | OXYGEN SATURATION: 99 % | BODY MASS INDEX: 23.87 KG/M2 | DIASTOLIC BLOOD PRESSURE: 91 MMHG | WEIGHT: 196 LBS | HEIGHT: 76 IN | SYSTOLIC BLOOD PRESSURE: 129 MMHG | TEMPERATURE: 98.7 F

## 2024-09-16 DIAGNOSIS — I83.813 VARICOSE VEINS OF BOTH LOWER EXTREMITIES WITH PAIN: Primary | ICD-10-CM

## 2024-09-16 PROCEDURE — 99213 OFFICE O/P EST LOW 20 MIN: CPT

## 2024-09-16 RX ORDER — HYDROCODONE BITARTRATE AND ACETAMINOPHEN 5; 325 MG/1; MG/1
1 TABLET ORAL EVERY 6 HOURS PRN
Qty: 12 TABLET | Refills: 0 | Status: SHIPPED | OUTPATIENT
Start: 2024-09-16 | End: 2024-09-19

## 2024-09-17 ENCOUNTER — OFFICE VISIT (OUTPATIENT)
Dept: VASCULAR SURGERY | Facility: HOSPITAL | Age: 39
End: 2024-09-17
Payer: COMMERCIAL

## 2024-09-17 VITALS
HEART RATE: 77 BPM | RESPIRATION RATE: 18 BRPM | DIASTOLIC BLOOD PRESSURE: 84 MMHG | SYSTOLIC BLOOD PRESSURE: 126 MMHG | OXYGEN SATURATION: 96 % | TEMPERATURE: 98.4 F

## 2024-09-17 DIAGNOSIS — L97.222 VENOUS STASIS ULCER OF LEFT CALF WITH FAT LAYER EXPOSED WITH VARICOSE VEINS: Primary | ICD-10-CM

## 2024-09-17 DIAGNOSIS — I83.022 VENOUS STASIS ULCER OF LEFT CALF WITH FAT LAYER EXPOSED WITH VARICOSE VEINS: Primary | ICD-10-CM

## 2024-09-17 PROCEDURE — G0463 HOSPITAL OUTPT CLINIC VISIT: HCPCS | Performed by: SURGERY

## 2024-09-17 PROCEDURE — 99203 OFFICE O/P NEW LOW 30 MIN: CPT | Performed by: SURGERY

## 2024-09-19 ENCOUNTER — OFFICE VISIT (OUTPATIENT)
Dept: WOUND CARE | Facility: HOSPITAL | Age: 39
End: 2024-09-19
Payer: COMMERCIAL

## 2024-09-19 VITALS — SYSTOLIC BLOOD PRESSURE: 124 MMHG | DIASTOLIC BLOOD PRESSURE: 78 MMHG | HEART RATE: 83 BPM | RESPIRATION RATE: 18 BRPM

## 2024-09-19 DIAGNOSIS — L97.929 VENOUS ULCER OF LEFT LOWER EXTREMITY WITH VARICOSE VEINS: Primary | ICD-10-CM

## 2024-09-19 DIAGNOSIS — I83.11 VARICOSE VEINS OF BOTH LOWER EXTREMITIES WITH INFLAMMATION: ICD-10-CM

## 2024-09-19 DIAGNOSIS — I83.029 VENOUS ULCER OF LEFT LOWER EXTREMITY WITH VARICOSE VEINS: Primary | ICD-10-CM

## 2024-09-19 DIAGNOSIS — I87.2 PERIPHERAL VENOUS INSUFFICIENCY: ICD-10-CM

## 2024-09-19 DIAGNOSIS — I83.12 VARICOSE VEINS OF BOTH LOWER EXTREMITIES WITH INFLAMMATION: ICD-10-CM

## 2024-09-19 PROCEDURE — G0463 HOSPITAL OUTPT CLINIC VISIT: HCPCS | Performed by: NURSE PRACTITIONER

## 2024-10-10 ENCOUNTER — OFFICE VISIT (OUTPATIENT)
Dept: WOUND CARE | Facility: HOSPITAL | Age: 39
End: 2024-10-10
Payer: COMMERCIAL

## 2024-10-10 VITALS
RESPIRATION RATE: 18 BRPM | SYSTOLIC BLOOD PRESSURE: 154 MMHG | HEART RATE: 66 BPM | DIASTOLIC BLOOD PRESSURE: 98 MMHG | TEMPERATURE: 97.3 F

## 2024-10-10 DIAGNOSIS — L97.929 VENOUS ULCER OF LEFT LOWER EXTREMITY WITH VARICOSE VEINS: Primary | ICD-10-CM

## 2024-10-10 DIAGNOSIS — I83.12 VARICOSE VEINS OF BOTH LOWER EXTREMITIES WITH INFLAMMATION: ICD-10-CM

## 2024-10-10 DIAGNOSIS — I87.2 PERIPHERAL VENOUS INSUFFICIENCY: ICD-10-CM

## 2024-10-10 DIAGNOSIS — I83.029 VENOUS ULCER OF LEFT LOWER EXTREMITY WITH VARICOSE VEINS: Primary | ICD-10-CM

## 2024-10-10 DIAGNOSIS — I83.11 VARICOSE VEINS OF BOTH LOWER EXTREMITIES WITH INFLAMMATION: ICD-10-CM

## 2024-10-10 PROCEDURE — G0463 HOSPITAL OUTPT CLINIC VISIT: HCPCS | Performed by: NURSE PRACTITIONER

## 2024-10-10 NOTE — LETTER
October 10, 2024     Patient: Tenzin Eastman   YOB: 1985   Date of Visit: 10/10/2024       To Whom It May Concern:    The above patient was seen here in the clinic today for treatment in conjunction with vascular services.            Sincerely,        VANDANA Ivey    CC: No Recipients

## 2024-10-10 NOTE — PROGRESS NOTES
Chief Complaint  Wound Check (Follow-up visit for left leg wounds. )    Subjective      Wound Check        Tenzin Eastman  is a 39 y.o. male with venous ulcers to the left lower extremity.    Patient was seen by vascular surgery on 9/17/2024 for her lower extremity venous stasis ulcers, venous insufficiency and superficial thrombophlebitis.  Patient has been referred to the vein clinic for further evaluation and possible intervention.    Duplex scan of the lower extremities conducted on 8/6/2024 demonstrated chronic left lower extremity superficial thrombophlebitis in the varicosity above and below the knee.  There was also superficial venous valvular incompetence in the right and left saphenofemoral junction.    Patient states that he has had varicose veins for very long time.  He states that he had a work injury quite a few years ago that affected his left upper thigh and since that time he started developing varicose veins on the left leg.  Shortly after that he started developing varicose veins on the right leg.  Patient works 5 days a week at a job where he is walking close to 20,000 steps per day.  He does continue to report some chronic swelling to his lower extremities with the left being worse than the right.  He has started to try to increase wearing his compression stockings to the left lower extremity at least 4 times per per week instead of 3 times.    He denies having wounds in the past on the right lower extremity but does state that he has had intermittent wounds to the left lower extremity that have usually resolved fairly quickly.  He states that these wounds have been present longer than usual.  He states that he feels like they should have been healed by now. Patient is uncertain exactly when the wounds started but he believes the medial ankle wound started from an injury when he hit it on something at work.  The lower leg wound he believes just appeared.    He presents again today with a  Band-Aids to both wounds that have a colloid substance along the center to assist with wound healing.  There is no additional dressing to the wound base.  Patient states that he never received the initial wound care shipment.  He states that the remaining collagen supplied to him after his last visit lasted him approximately 4 days but since that time he has only been applying the colloid band-aids.  The office was not notified that the supplies were not received.    Patient did state today that he has noticed that there is an increased amount of pain when standing in 1 position for a prolonged period of time.  This pain is not new and is consistent however it is something that he has become aware of.  He denies any increase in drainage.  He denies any increased edema.    He reports smoking casually or socially but not consistently.  He consumes alcohol occasionally.      Allergies:  Ibuprofen, Naproxen, and Penicillins    No current outpatient medications on file.    Past Medical History:   Diagnosis Date    Anxiety     Depression     Peripheral venous insufficiency     bilateral superficial and deep reflux       Thrombophlebitis     superficial veins of lower leg - LEFT thigh       Tobacco dependence syndrome     Varicose veins of lower extremity     with ulcer AND inflammation - bilateral       Varicose veins of unspecified lower extremities with other complications     compression stocking     Past Surgical History:   Procedure Laterality Date    ELBOW PROCEDURE Right     bone spur removal     Social History     Socioeconomic History    Marital status:    Tobacco Use    Smoking status: Former     Current packs/day: 0.00     Average packs/day: 1 pack/day for 20.0 years (20.0 ttl pk-yrs)     Types: Cigarettes     Start date: 2000     Quit date: 2020     Years since quittin.7     Passive exposure: Never    Smokeless tobacco: Never   Vaping Use    Vaping status: Former    Substances: THC     Devices: Pre-filled or refillable cartridge, Pre-filled pod   Substance and Sexual Activity    Alcohol use: Not Currently     Alcohol/week: 3.0 standard drinks of alcohol     Types: 3 Cans of beer per week    Drug use: Not Currently     Types: Marijuana    Sexual activity: Yes           Objective     Vitals:    10/10/24 1532   BP: 154/98   BP Location: Right arm   Patient Position: Sitting   Pulse: 66   Resp: 18  Comment: 98% 02   Temp: 97.3 °F (36.3 °C)   TempSrc: Temporal   PainSc: 0-No pain       There is no height or weight on file to calculate BMI.    The following data has been reviewed by VANDANA Ivey on 10/10/2024   Most Recent A1C          8/6/2024    09:14   HGBA1C Most Recent   Hemoglobin A1C 5.20          STEADI Fall Risk Assessment has not been completed.     Review of Systems     ROS:  Per HPI.     I have reviewed the HPI and ROS as documented by MA/RN. VANDANA Ivey    Physical Exam  Musculoskeletal:      Right lower leg: No edema.      Left lower leg: No edema.          NAD  AAOx3, pleasant, cooperative    Significant protruding varicosities visible to bilateral lower extremities.  Worse on the left.    LLE : Small circular ulceration along the medial anterior aspect of the lower leg.  Slight improvement from prior assessment.  Healthy red tissue within the base.  Minor contraction of wound margins and slight decrease in wound depth.  Periwound tissue discoloration and scarring.  No active drainage.  No erythema.  No edema.  No active signs of infection.    Left medial ankle: Open her ulceration along the left medial ankle with slight improvement..  Jagged irregular edges.  Shallow wound base.  Wound base with red moist tissue.  Central aspect of wound base with dark protruding soft raised vessel appearing structure that is slightly less visible from prior assessment.  No active drainage.  Periwound discoloration and scarring.  No edema.  No erythema.  No active signs of  infection.    See photos for details.    LLE:         Left medial ankle:          Result Review :  The following data was reviewed by: VANDANA Ivey on 10/10/2024:    Prior Wound care notes and images    Assessment and Plan   Diagnoses and all orders for this visit:    1. Venous ulcer of left lower extremity with varicose veins (Primary)    2. Varicose veins of both lower extremities with inflammation    3. Peripheral venous insufficiency        Patient with chronic venous ulcers to left lower extremity with delayed wound healing.  He has extensive varicose veins to bilateral lower extremities and intermittent persistent edema.    Wounds to left lower extremity are slightly improved from prior assessment and continue to have a healthy wound base appearance.  I am going to recommend daily dressing changes that will include cleansing site with saline and gauze or antibacterial soap and then application of collagen with silver, such as [place], and then covering with a dry dressing.  Patient may continue to use his Band-Aids with colloid centers per his preference.     Call was placed to the medical supply company to inquire about patient not receiving supplies previously ordered.  Cost may be a factor in patient being able to receive the recommended collagen with silver.  Will look into additional supply options and may need to make additional recommendations.  If additional recommendations are made, will indicate further.    Discussed with patient again the importance of edema management for wound healing as well as for varicose vein support.  Discussed with patient that it would be ideal if he could wear compression every day that he works at a minimum.  Further discussed with patient that if he is very active on the weekends however that it may be a necessity to consider compression on a daily basis.    Reviewed proper nutrition for wound healing with patient.  Patient is encouraged to increase protein  intake and maintain a healthy balanced diet.    Follow-up with vein clinic as scheduled.    Contact office with any questions or concerns prior to visit.    Follow-up in 4 weeks    Patient was given instructions and counseling regarding their condition or for health maintenance advice, as well as the wound care plan and recommendations. They understand and agree with the plan.  They will follow back up here in the clinic but are instructed to contact us in the interim should they have any new, returning, or worsening symptoms or concerns. Please see specific information pulled into the AVS if appropriate.     Dragon Dictation utilized for chart completion.    Follow Up   Return in about 4 weeks (around 11/7/2024) for Recheck.      Annemarie Osullivan, APRN

## 2024-11-07 ENCOUNTER — OFFICE VISIT (OUTPATIENT)
Dept: WOUND CARE | Facility: HOSPITAL | Age: 39
End: 2024-11-07
Payer: COMMERCIAL

## 2024-11-07 VITALS
DIASTOLIC BLOOD PRESSURE: 89 MMHG | RESPIRATION RATE: 18 BRPM | HEART RATE: 78 BPM | SYSTOLIC BLOOD PRESSURE: 138 MMHG | TEMPERATURE: 98.1 F

## 2024-11-07 DIAGNOSIS — I83.11 VARICOSE VEINS OF BOTH LOWER EXTREMITIES WITH INFLAMMATION: ICD-10-CM

## 2024-11-07 DIAGNOSIS — L97.929 VENOUS ULCER OF LEFT LOWER EXTREMITY WITH VARICOSE VEINS: Primary | ICD-10-CM

## 2024-11-07 DIAGNOSIS — I87.2 PERIPHERAL VENOUS INSUFFICIENCY: ICD-10-CM

## 2024-11-07 DIAGNOSIS — I83.12 VARICOSE VEINS OF BOTH LOWER EXTREMITIES WITH INFLAMMATION: ICD-10-CM

## 2024-11-07 DIAGNOSIS — I83.029 VENOUS ULCER OF LEFT LOWER EXTREMITY WITH VARICOSE VEINS: Primary | ICD-10-CM

## 2024-11-07 DIAGNOSIS — T14.8XXD DELAYED WOUND HEALING: ICD-10-CM

## 2024-11-07 PROCEDURE — G0463 HOSPITAL OUTPT CLINIC VISIT: HCPCS | Performed by: NURSE PRACTITIONER

## 2024-11-07 NOTE — PROGRESS NOTES
Chief Complaint  Wound Check (Follow-up visit for left lower leg wounds. )    Subjective      Wound Check        Tenzin Eastman  is a 39 y.o. male with venous ulcers to the left lower extremity.    Patient was seen by vascular surgery on 9/17/2024 for her lower extremity venous stasis ulcers, venous insufficiency and superficial thrombophlebitis.  Patient has been referred to the vein clinic for further evaluation and possible intervention.    Duplex scan of the lower extremities conducted on 8/6/2024 demonstrated chronic left lower extremity superficial thrombophlebitis in the varicosity above and below the knee.  There was also superficial venous valvular incompetence in the right and left saphenofemoral junction.    Patient states that he has had varicose veins for very long time.  He states that he had a work injury quite a few years ago that affected his left upper thigh and since that time he started developing varicose veins on the left leg.  Shortly after that he started developing varicose veins on the right leg.  Patient works 5 days a week at a job where he is walking close to 20,000 steps per day.  He does continue to report some chronic swelling to his lower extremities with the left being worse than the right.  He has started to try to increase wearing his compression stockings to the left lower extremity at least 4 times per per week instead of 3 times.    His RLE remains without wounds at this time, however his LLE continues to have persistent wounds that he feels should have healed a long time based on his experiences in the past.. Patient believes the medial ankle wound started from an injury when he hit it on something at work.  The lower leg wound he believes just appeared.    He presents again today with a Band-Aids to both wounds that have a colloid substance along the center but does also have collagen within the wound bases. He states he never received the supplies from the medical  supply company and was not contacted by them but he did end up ordering the collagen from [employer] based on the product package we provided. Patient has been applying the collagen when he notes that it is not longer present in the wound bed but has not been cleaning it out and reapplying periodically.    Patient does have continued pain when standing in one position for a prolonged period of time but otherwise no new concerns. He denies any increase in drainage.  He denies any increased edema.    He reports smoking casually or socially but not consistently.  He consumes alcohol occasionally.      Allergies:  Ibuprofen, Naproxen, and Penicillins    No current outpatient medications on file.    Past Medical History:   Diagnosis Date    Anxiety     Depression     Peripheral venous insufficiency     bilateral superficial and deep reflux       Thrombophlebitis     superficial veins of lower leg - LEFT thigh       Tobacco dependence syndrome     Varicose veins of lower extremity     with ulcer AND inflammation - bilateral       Varicose veins of unspecified lower extremities with other complications     compression stocking     Past Surgical History:   Procedure Laterality Date    ELBOW PROCEDURE Right     bone spur removal     Social History     Socioeconomic History    Marital status:    Tobacco Use    Smoking status: Former     Current packs/day: 0.00     Average packs/day: 1 pack/day for 20.0 years (20.0 ttl pk-yrs)     Types: Cigarettes     Start date: 2000     Quit date: 2020     Years since quittin.8     Passive exposure: Never    Smokeless tobacco: Never   Vaping Use    Vaping status: Former    Substances: THC    Devices: Pre-filled or refillable cartridge, Pre-filled pod   Substance and Sexual Activity    Alcohol use: Not Currently     Alcohol/week: 3.0 standard drinks of alcohol     Types: 3 Cans of beer per week    Drug use: Not Currently     Types: Marijuana    Sexual activity: Yes            Objective     Vitals:    11/07/24 1512   BP: 138/89   BP Location: Left arm   Patient Position: Sitting   Pulse: 78   Resp: 18  Comment: 98 02   Temp: 98.1 °F (36.7 °C)   TempSrc: Temporal   PainSc:   3   PainLoc: Leg         There is no height or weight on file to calculate BMI.    The following data has been reviewed by VANDANA Ivey on 11/07/2024   Most Recent A1C          8/6/2024    09:14   HGBA1C Most Recent   Hemoglobin A1C 5.20          STEADI Fall Risk Assessment has not been completed.     Review of Systems     ROS:  Per HPI.     I have reviewed the HPI and ROS as documented by MA/RN. VANDANA Ivey    Physical Exam  Musculoskeletal:      Right lower leg: No edema.      Left lower leg: No edema.          NAD  AAOx3, pleasant, cooperative    Significant protruding varicosities visible to bilateral lower extremities.  Worse on the left.    LLE : Small shallow ulceration along the medial aspect of the lower leg.  Significant improvement from prior assessment. Healthy red moist tissue within the base.  Visible contractions of wound margins and significant decrease in wound depth.  Chronic periwound tissue discoloration and scarring.  No active drainage.  No erythema.  No edema.  No active signs of infection.    Left medial ankle: Shallow open ulceration along the left medial ankle.  Jagged irregular edges.  Good improvement from prior assessment.  Wound base with healthy red moist tissue.  Previously noted dark protruding soft raised vessel appearing structure to the central aspect of the wound base with more tissue coverage this visit.  Wound margins are ana well and wound depth is filling in.  Small amount of crusting along the wound edges.  No active drainage.  There is elza-wound discoloration and scarring.  No edema.  No erythema.  No active signs of infection.    See photos for details.    LLE:         Left medial ankle:          Result Review :  The following data was  reviewed by: VANDANA Ivey on 11/07/2024:    Prior Wound care notes and images    Assessment and Plan   Diagnoses and all orders for this visit:    1. Venous ulcer of left lower extremity with varicose veins (Primary)    2. Varicose veins of both lower extremities with inflammation    3. Peripheral venous insufficiency    4. Delayed wound healing      Patient with chronic venous ulcers to left lower extremity with delayed wound healing.  He has extensive varicose veins to bilateral lower extremities and reported intermittent persistent edema.    Patient demonstrates significant improvement to the wounds to left lower extremity from prior assessment. They have a healthy wound base appearance and are decreasing in size.  I am going to recommend continued daily dressing changes that will include cleansing site with saline and gauze or antibacterial soap and then application of collagen with silver, such as [place], and then covering with a dry dressing.  Patient may continue to use his Band-Aids with colloid centers per his preference.  The application of the collagen with silver to the wound base can be applied every other day if the collagen is not absorbed by the wound base with each dressing change.  Patient was educated on what to look for during his dressing changes.  Patient was however instructed to ensure that at minimum every other day the remaining collagen is cleansed out and removed from the wound based and replaced with a new piece of collagen.    Patient continues to wear compression socks for work as he works long hours and usually has at minimum 20,000 steps for each shift.  He does state that he wears compression stockings at minimum 4 days/week.  Encouraged patient to continue to try and wear compression stockings every day that he is working and possibly even on the weekends to assist with edema management and also the support of his varicosities to his legs.    Patient encouraged to  maintain a well-balanced diet to support overall wound healing.  This should include adequate intake of protein.    Contact office with any questions or concerns prior to visit.    Follow-up in 4 weeks    Patient was given instructions and counseling regarding their condition or for health maintenance advice, as well as the wound care plan and recommendations. They understand and agree with the plan.  They will follow back up here in the clinic but are instructed to contact us in the interim should they have any new, returning, or worsening symptoms or concerns. Please see specific information pulled into the AVS if appropriate.     Dragon Dictation utilized for chart completion.    Follow Up   Return in about 4 weeks (around 12/5/2024) for Wound Check.      VANDANA Ivey

## 2024-11-07 NOTE — LETTER
November 7, 2024     Patient: Tenzin Eastman   YOB: 1985   Date of Visit: 11/7/2024       To Whom It May Concern:    Patient was seen here in the clinic today as part of a Vascular Follow-up Care. Patient may return to work on 11/08/2024.           Sincerely,        VANDANA Ivey    CC: No Recipients

## 2025-04-01 ENCOUNTER — OFFICE VISIT (OUTPATIENT)
Dept: FAMILY MEDICINE CLINIC | Facility: CLINIC | Age: 40
End: 2025-04-01
Payer: COMMERCIAL

## 2025-04-01 VITALS
TEMPERATURE: 98 F | DIASTOLIC BLOOD PRESSURE: 91 MMHG | SYSTOLIC BLOOD PRESSURE: 152 MMHG | WEIGHT: 200.8 LBS | HEART RATE: 74 BPM | OXYGEN SATURATION: 100 % | RESPIRATION RATE: 18 BRPM | HEIGHT: 76 IN | BODY MASS INDEX: 24.45 KG/M2

## 2025-04-01 DIAGNOSIS — Z13.29 SCREENING FOR THYROID DISORDER: ICD-10-CM

## 2025-04-01 DIAGNOSIS — Z13.220 SCREENING, LIPID: Primary | ICD-10-CM

## 2025-04-01 DIAGNOSIS — I10 PRIMARY HYPERTENSION: ICD-10-CM

## 2025-04-01 RX ORDER — LOSARTAN POTASSIUM 25 MG/1
25 TABLET ORAL DAILY
Qty: 30 TABLET | Refills: 0 | Status: SHIPPED | OUTPATIENT
Start: 2025-04-01

## 2025-04-01 RX ORDER — LOSARTAN POTASSIUM 25 MG/1
25 TABLET ORAL DAILY
Qty: 90 TABLET | OUTPATIENT
Start: 2025-04-01

## 2025-04-01 NOTE — PROGRESS NOTES
Chief Complaint     Blood Pressure Check (Patient has been checking at work, Friday checked it and was 152/111, has never been on BP medication before, did not go anywhere, stated he just didn't feel right but no headache, chest pain, no flushing)    Patient or patient representative verbalized consent for the use of Ambient Listening during the visit with  VANDANA Durant for chart documentation. 2025  11:44 EDT    History of Present Illness     Tenzin Eastman is a 40 y.o. male who presents to [facility] .    History of Present Illness  The patient presents for evaluation of blood pressure issues.    He has been experiencing elevated blood pressure readings, with a recent measurement of 152/111 on Friday morning. Subsequent readings indicated a gradual decrease in blood pressure. His typical blood pressure range is between 120 to 130 over 80. On the aforementioned Friday, he reported an unusual sensation but did not experience any headache or chest pain. He has initiated lifestyle modifications, including the DASH diet and a 30-minute jump rope exercise regimen. He also expressed interest in discussing the potential impact of sodium intake on his condition and the use of supplements. He inquired about the potential impact of blood pressure medication on his libido. He has a family history of heart disease on his paternal side, with his grandfather and uncle both having succumbed to heart attacks. His father has been on antihypertensive medication since the age of 40.    SOCIAL HISTORY  The patient smokes about half a pack a week when stressed.    FAMILY HISTORY  The patient's paternal grandfather and uncle  of heart attacks.         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 AND inflammation - bilateral       Varicose veins of unspecified lower extremities with other complications     compression stocking       Past Surgical History:   Procedure Laterality Date    ELBOW PROCEDURE Right     bone spur removal       Family History   Problem Relation Age of Onset    Depression Mother     Hypertension Father     Dementia Maternal Grandmother     Heart disease Paternal Grandfather     Depression Other     Hypertension Other         Current Medications        Current Outpatient Medications:     losartan (COZAAR) 25 MG tablet, Take 1 tablet by mouth Daily., Disp: 30 tablet, Rfl: 0     Allergies     Allergies   Allergen Reactions    Ibuprofen     Naproxen Itching    Penicillins        Social History       Social History     Social History Narrative    Not on file       Immunizations     Immunization:  Immunization History   Administered Date(s) Administered    COVID-19 (PFIZER) Purple Cap Monovalent 04/22/2021, 05/12/2021    Influenza Injectable Mdck Pf Quad 10/13/2020    Influenza, Unspecified 10/13/2020    TD Preservative Free (Tenivac) 07/02/2018          Objective     Objective     Vital Signs:   /91 (BP Location: Left arm, Patient Position: Sitting)   Pulse 74   Temp 98 °F (36.7 °C)   Resp 18   Ht 193 cm (76\")   Wt 91.1 kg (200 lb 12.8 oz)   SpO2 100%   BMI 24.44 kg/m²       Physical Exam  Constitutional:       Appearance: Normal appearance.   HENT:      Nose: Nose normal.      Mouth/Throat:      Mouth: Mucous membranes are moist.   Cardiovascular:      Rate and Rhythm: Normal rate and regular rhythm.      Pulses: Normal pulses.      Heart sounds: Normal heart sounds.   Pulmonary:      Effort: Pulmonary effort is normal.      Breath sounds: Normal breath sounds.   Skin:     General: Skin is warm and dry.   Neurological:      General: No focal deficit present.      Mental Status: He is alert and " oriented to person, place, and time.   Psychiatric:         Mood and Affect: Mood normal.         Behavior: Behavior normal.         Physical Exam  Lungs sound normal.  Heart sounds normal.    Vital Signs  Blood pressure was 152/111 on Friday morning. Blood pressure readings of 154/98 and 138/89 were previously recorded. BMI is 24.      Results    The following data was reviewed by: VANDANA Durant on 04/01/2025:          Results         Assessment and Plan        Assessment and Plan    Diagnoses and all orders for this visit:    1. Screening, lipid (Primary)  -     Lipid panel; Future    2. Primary hypertension  Assessment & Plan:  Hypertension is uncontrolled  Medication changes per orders.  Stop smoking.  Ambulatory blood pressure monitoring.  Blood pressure will be reassessedin 4 weeks.    Orders:  -     CBC w AUTO Differential; Future  -     Comprehensive metabolic panel; Future  -     losartan (COZAAR) 25 MG tablet; Take 1 tablet by mouth Daily.  Dispense: 30 tablet; Refill: 0    3. Screening for thyroid disorder  -     TSH+Free T4; Future        Assessment & Plan  1. Hypertension.  His diastolic blood pressure consistently registers at a higher level. Previous blood pressure readings were 154/98 and 138/89. He has been advised to adhere to a DASH diet, engage in regular exercise, and limit sodium intake to less than 2000 mg per day. A daily multivitamin supplement has been recommended. He will commence a regimen of losartan 25 mg. He has been instructed to monitor his blood pressure daily at home for the next 2 weeks and record the readings. Fasting lab work has been ordered to be completed before the next visit. If he experiences dizziness or shortness of breath, he should discontinue the medication immediately. If fatigue persists until the next appointment, a change in medication will be considered.    Follow-up  The patient will follow up in 2 weeks.      I spent 30 minutes caring for Tenzin on this  date of service. This time includes time spent by me in the following activities:preparing for the visit, reviewing tests, performing a medically appropriate examination and/or evaluation , counseling and educating the patient/family/caregiver, ordering medications, tests, or procedures, and documenting information in the medical record  Follow Up        Follow Up   Return in about 2 weeks (around 4/15/2025).  Patient was given instructions and counseling regarding his condition or for health maintenance advice. Please see specific information pulled into the AVS if appropriate.

## 2025-04-01 NOTE — ASSESSMENT & PLAN NOTE
Hypertension is uncontrolled  Medication changes per orders.  Stop smoking.  Ambulatory blood pressure monitoring.  Blood pressure will be reassessedin 4 weeks.

## 2025-04-04 ENCOUNTER — LAB (OUTPATIENT)
Dept: LAB | Facility: HOSPITAL | Age: 40
End: 2025-04-04
Payer: COMMERCIAL

## 2025-04-04 DIAGNOSIS — Z13.29 SCREENING FOR THYROID DISORDER: ICD-10-CM

## 2025-04-04 DIAGNOSIS — Z13.220 SCREENING, LIPID: ICD-10-CM

## 2025-04-04 DIAGNOSIS — I10 PRIMARY HYPERTENSION: ICD-10-CM

## 2025-04-04 LAB
ALBUMIN SERPL-MCNC: 4.7 G/DL (ref 3.5–5.2)
ALBUMIN/GLOB SERPL: 1.9 G/DL
ALP SERPL-CCNC: 66 U/L (ref 39–117)
ALT SERPL W P-5'-P-CCNC: 18 U/L (ref 1–41)
ANION GAP SERPL CALCULATED.3IONS-SCNC: 12.5 MMOL/L (ref 5–15)
AST SERPL-CCNC: 23 U/L (ref 1–40)
BASOPHILS # BLD AUTO: 0.05 10*3/MM3 (ref 0–0.2)
BASOPHILS NFR BLD AUTO: 0.8 % (ref 0–1.5)
BILIRUB SERPL-MCNC: 1.4 MG/DL (ref 0–1.2)
BUN SERPL-MCNC: 16 MG/DL (ref 6–20)
BUN/CREAT SERPL: 15.7 (ref 7–25)
CALCIUM SPEC-SCNC: 9.5 MG/DL (ref 8.6–10.5)
CHLORIDE SERPL-SCNC: 100 MMOL/L (ref 98–107)
CHOLEST SERPL-MCNC: 177 MG/DL (ref 0–200)
CO2 SERPL-SCNC: 25.5 MMOL/L (ref 22–29)
CREAT SERPL-MCNC: 1.02 MG/DL (ref 0.76–1.27)
DEPRECATED RDW RBC AUTO: 42.4 FL (ref 37–54)
EGFRCR SERPLBLD CKD-EPI 2021: 94.7 ML/MIN/1.73
EOSINOPHIL # BLD AUTO: 0.1 10*3/MM3 (ref 0–0.4)
EOSINOPHIL NFR BLD AUTO: 1.6 % (ref 0.3–6.2)
ERYTHROCYTE [DISTWIDTH] IN BLOOD BY AUTOMATED COUNT: 12.2 % (ref 12.3–15.4)
GLOBULIN UR ELPH-MCNC: 2.5 GM/DL
GLUCOSE SERPL-MCNC: 84 MG/DL (ref 65–99)
HCT VFR BLD AUTO: 47.9 % (ref 37.5–51)
HDLC SERPL-MCNC: 46 MG/DL (ref 40–60)
HGB BLD-MCNC: 15.9 G/DL (ref 13–17.7)
IMM GRANULOCYTES # BLD AUTO: 0.02 10*3/MM3 (ref 0–0.05)
IMM GRANULOCYTES NFR BLD AUTO: 0.3 % (ref 0–0.5)
LDLC SERPL CALC-MCNC: 118 MG/DL (ref 0–100)
LDLC/HDLC SERPL: 2.54 {RATIO}
LYMPHOCYTES # BLD AUTO: 2.08 10*3/MM3 (ref 0.7–3.1)
LYMPHOCYTES NFR BLD AUTO: 33.8 % (ref 19.6–45.3)
MCH RBC QN AUTO: 31.3 PG (ref 26.6–33)
MCHC RBC AUTO-ENTMCNC: 33.2 G/DL (ref 31.5–35.7)
MCV RBC AUTO: 94.3 FL (ref 79–97)
MONOCYTES # BLD AUTO: 0.41 10*3/MM3 (ref 0.1–0.9)
MONOCYTES NFR BLD AUTO: 6.7 % (ref 5–12)
NEUTROPHILS NFR BLD AUTO: 3.5 10*3/MM3 (ref 1.7–7)
NEUTROPHILS NFR BLD AUTO: 56.8 % (ref 42.7–76)
NRBC BLD AUTO-RTO: 0 /100 WBC (ref 0–0.2)
PLATELET # BLD AUTO: 235 10*3/MM3 (ref 140–450)
PMV BLD AUTO: 9.3 FL (ref 6–12)
POTASSIUM SERPL-SCNC: 5.2 MMOL/L (ref 3.5–5.2)
PROT SERPL-MCNC: 7.2 G/DL (ref 6–8.5)
RBC # BLD AUTO: 5.08 10*6/MM3 (ref 4.14–5.8)
SODIUM SERPL-SCNC: 138 MMOL/L (ref 136–145)
T4 FREE SERPL-MCNC: 1.33 NG/DL (ref 0.92–1.68)
TRIGL SERPL-MCNC: 70 MG/DL (ref 0–150)
TSH SERPL DL<=0.05 MIU/L-ACNC: 1.67 UIU/ML (ref 0.27–4.2)
VLDLC SERPL-MCNC: 13 MG/DL (ref 5–40)
WBC NRBC COR # BLD AUTO: 6.16 10*3/MM3 (ref 3.4–10.8)

## 2025-04-04 PROCEDURE — 84439 ASSAY OF FREE THYROXINE: CPT

## 2025-04-04 PROCEDURE — 80050 GENERAL HEALTH PANEL: CPT

## 2025-04-04 PROCEDURE — 36415 COLL VENOUS BLD VENIPUNCTURE: CPT

## 2025-04-04 PROCEDURE — 80061 LIPID PANEL: CPT

## 2025-04-15 ENCOUNTER — OFFICE VISIT (OUTPATIENT)
Dept: FAMILY MEDICINE CLINIC | Facility: CLINIC | Age: 40
End: 2025-04-15
Payer: COMMERCIAL

## 2025-04-15 VITALS
HEIGHT: 76 IN | BODY MASS INDEX: 24.36 KG/M2 | SYSTOLIC BLOOD PRESSURE: 142 MMHG | OXYGEN SATURATION: 100 % | WEIGHT: 200 LBS | TEMPERATURE: 98.7 F | RESPIRATION RATE: 16 BRPM | DIASTOLIC BLOOD PRESSURE: 82 MMHG | HEART RATE: 65 BPM

## 2025-04-15 DIAGNOSIS — R17 ELEVATED BILIRUBIN: Primary | ICD-10-CM

## 2025-04-15 DIAGNOSIS — I10 PRIMARY HYPERTENSION: ICD-10-CM

## 2025-04-15 PROCEDURE — 99213 OFFICE O/P EST LOW 20 MIN: CPT | Performed by: FAMILY MEDICINE

## 2025-04-15 RX ORDER — LOSARTAN POTASSIUM 25 MG/1
25 TABLET ORAL DAILY
Qty: 90 TABLET | Refills: 3 | Status: SHIPPED | OUTPATIENT
Start: 2025-04-15

## 2025-04-15 NOTE — PROGRESS NOTES
"Chief Complaint  Blood Pressure Check (Follow up on losartan. )    Subjective        Tenzin Eastman presents to [facility]  History of Present Illness    Presents to follow up on blood pressure no concerns      Objective   Vital Signs:  /82 (BP Location: Left arm, Patient Position: Sitting, Cuff Size: Adult)   Pulse 65   Temp 98.7 °F (37.1 °C)   Resp 16   Ht 193 cm (76\")   Wt 90.7 kg (200 lb)   SpO2 100%   BMI 24.34 kg/m²   Estimated body mass index is 24.34 kg/m² as calculated from the following:    Height as of this encounter: 193 cm (76\").    Weight as of this encounter: 90.7 kg (200 lb).    BMI is within normal parameters. No other follow-up for BMI required.      Physical Exam  Vitals reviewed.   Constitutional:       Appearance: Normal appearance. He is well-developed.   HENT:      Head: Normocephalic and atraumatic.      Right Ear: External ear normal.      Left Ear: External ear normal.      Nose: Nose normal.   Eyes:      Conjunctiva/sclera: Conjunctivae normal.      Pupils: Pupils are equal, round, and reactive to light.   Cardiovascular:      Rate and Rhythm: Normal rate.   Pulmonary:      Effort: Pulmonary effort is normal.      Breath sounds: Normal breath sounds.   Abdominal:      General: There is no distension.   Skin:     General: Skin is warm and dry.   Neurological:      Mental Status: He is alert and oriented to person, place, and time. Mental status is at baseline.   Psychiatric:         Mood and Affect: Mood and affect normal.         Behavior: Behavior normal.         Thought Content: Thought content normal.         Judgment: Judgment normal.        Result Review :  The following data was reviewed by: Urbano Cabrera DO on 04/15/2025:  Common labs          8/6/2024    09:14 4/4/2025    09:40   Common Labs   Glucose 92  84    BUN 14  16    Creatinine 1.08  1.02    Sodium 139  138    Potassium 5.0  5.2    Chloride 102  100    Calcium 10.1  9.5  "   Albumin 4.6  4.7    Total Bilirubin 0.9  1.4    Alkaline Phosphatase 59  66    AST (SGOT) 16  23    ALT (SGPT) 12  18    WBC 4.42  6.16    Hemoglobin 15.5  15.9    Hematocrit 47.3  47.9    Platelets 208  235    Total Cholesterol 175  177    Triglycerides 79  70    HDL Cholesterol 47  46    LDL Cholesterol  113  118    Hemoglobin A1C 5.20                 Assessment and Plan   Diagnoses and all orders for this visit:    1. Elevated bilirubin (Primary)  -     Hemoglobin A1c; Future  -     Comprehensive Metabolic Panel; Future  -     Lipid Panel; Future  -     CBC (No Diff); Future  -     TSH+Free T4; Future    2. Primary hypertension  -     losartan (COZAAR) 25 MG tablet; Take 1 tablet by mouth Daily.  Dispense: 90 tablet; Refill: 3  -     Hemoglobin A1c; Future  -     Comprehensive Metabolic Panel; Future  -     Lipid Panel; Future  -     CBC (No Diff); Future  -     TSH+Free T4; Future      Continue medicines   Bp goal <140/90    Labs ordered to recheck 6 months          Follow Up   Return in about 6 months (around 10/15/2025), or if symptoms worsen or fail to improve, for Next scheduled follow up, Recheck.  Patient was given instructions and counseling regarding his condition or for health maintenance advice. Please see specific information pulled into the AVS if appropriate.

## 2025-04-25 ENCOUNTER — TELEPHONE (OUTPATIENT)
Dept: FAMILY MEDICINE CLINIC | Facility: CLINIC | Age: 40
End: 2025-04-25
Payer: COMMERCIAL

## 2025-04-25 DIAGNOSIS — I10 PRIMARY HYPERTENSION: ICD-10-CM

## 2025-04-25 NOTE — TELEPHONE ENCOUNTER
Patient states he has been taking the losartan and it has not changed his blood pressure at all. At low its in 140s/90s

## 2025-04-25 NOTE — TELEPHONE ENCOUNTER
Caller: Tenzin Eastman    Relationship to patient: Self    Best call back number: [phone]     Patient is needing: PATIENT WAS SEEN BY PROVIDER ON 4.15.25 AND WAS PRESCRIBED LOSARTIN.  PATIENT SAYS THE MEDICATION IS NOT WORKING BECAUSE HIS BLOOD PRESSURE IS STILL HIGH.    PATIENT WOULD LIKE CALL BACK TO DISCUSS PLAN OF CARE

## 2025-04-28 RX ORDER — LOSARTAN POTASSIUM 50 MG/1
50 TABLET ORAL 2 TIMES DAILY
Qty: 60 TABLET | Refills: 2 | Status: SHIPPED | OUTPATIENT
Start: 2025-04-28

## 2025-04-28 NOTE — TELEPHONE ENCOUNTER
Ill change to 50mg daily so you can take 2 tabs of what you have for now     then in a week if not doing better I went ahead and sent 50mg to take up to 2 times daily if still not at goal <140/90     Follow up in a few weeks to review

## 2025-04-28 NOTE — TELEPHONE ENCOUNTER
Provided medication instructions to pt. Pt verbalized understanding.   Did not want to schedule follow up at this time.

## 2025-04-29 DIAGNOSIS — I10 PRIMARY HYPERTENSION: ICD-10-CM

## 2025-04-29 RX ORDER — LOSARTAN POTASSIUM 25 MG/1
25 TABLET ORAL DAILY
Qty: 30 TABLET | Refills: 0 | OUTPATIENT
Start: 2025-04-29

## 2025-05-04 ENCOUNTER — PATIENT MESSAGE (OUTPATIENT)
Dept: FAMILY MEDICINE CLINIC | Facility: CLINIC | Age: 40
End: 2025-05-04
Payer: COMMERCIAL

## 2025-06-18 ENCOUNTER — OFFICE VISIT (OUTPATIENT)
Dept: FAMILY MEDICINE CLINIC | Facility: CLINIC | Age: 40
End: 2025-06-18
Payer: COMMERCIAL

## 2025-06-18 VITALS
BODY MASS INDEX: 22.89 KG/M2 | SYSTOLIC BLOOD PRESSURE: 138 MMHG | WEIGHT: 188 LBS | OXYGEN SATURATION: 100 % | HEIGHT: 76 IN | HEART RATE: 72 BPM | DIASTOLIC BLOOD PRESSURE: 84 MMHG | TEMPERATURE: 98.7 F

## 2025-06-18 DIAGNOSIS — L97.329 VENOUS STASIS ULCER OF ANKLE, LEFT: Primary | ICD-10-CM

## 2025-06-18 DIAGNOSIS — I10 PRIMARY HYPERTENSION: Chronic | ICD-10-CM

## 2025-06-18 DIAGNOSIS — I83.11 VARICOSE VEINS OF BOTH LOWER EXTREMITIES WITH INFLAMMATION: Chronic | ICD-10-CM

## 2025-06-18 DIAGNOSIS — I87.2 PERIPHERAL VENOUS INSUFFICIENCY: Chronic | ICD-10-CM

## 2025-06-18 DIAGNOSIS — I83.023 VENOUS STASIS ULCER OF ANKLE, LEFT: Primary | ICD-10-CM

## 2025-06-18 DIAGNOSIS — I83.12 VARICOSE VEINS OF BOTH LOWER EXTREMITIES WITH INFLAMMATION: Chronic | ICD-10-CM

## 2025-06-18 RX ORDER — MUPIROCIN 20 MG/G
1 OINTMENT TOPICAL 3 TIMES DAILY
Qty: 30 G | Refills: 0 | Status: SHIPPED | OUTPATIENT
Start: 2025-06-18

## 2025-06-18 NOTE — PROGRESS NOTES
Chief Complaint     Ankle Injury (Left, ulcer that has not gone away for a year )    Patient or patient representative verbalized consent for the use of Ambient Listening during the visit with  VANDANA Durant for chart documentation. 6/18/2025  10:50 EDT    History of Present Illness     Tenzin Eastman is a 40 y.o. male who presents to [facility] .    History of Present Illness  The patient is a 40-year-old male who presents for evaluation of an ankle wound.    He has been living with a non-healing wound on his ankle for approximately one year. Despite some improvement, the wound remains unhealed. He discontinued wound care due to financial constraints. He reports intermittent pain, which is managed with Excedrin. His home care regimen includes keeping the wound covered and applying Aquaphor.         History      Past Medical History:   Diagnosis Date    Anxiety     Depression     Peripheral venous insufficiency     bilateral superficial and deep reflux       Thrombophlebitis     superficial veins of lower leg - LEFT thigh       Tobacco dependence syndrome     Varicose veins of lower extremity     with ulcer AND inflammation - bilateral       Varicose veins of unspecified lower extremities with other complications     compression stocking       Past Surgical History:   Procedure Laterality Date    ELBOW PROCEDURE Right     bone spur removal       Family History   Problem Relation Age of Onset    Depression Mother     Hypertension Father     Dementia Maternal Grandmother     Heart disease Paternal Grandfather     Depression Other     Hypertension Other         Current Medications        Current Outpatient Medications:     aspirin-acetaminophen-caffeine (EXCEDRIN MIGRAINE) 250-250-65 MG per tablet, Take 1 tablet by mouth Every 6 (Six) Hours As Needed for Headache., Disp: , Rfl:     losartan (COZAAR) 50 MG tablet, Take 1 tablet by mouth 2 (Two) Times a Day., Disp: 60  "tablet, Rfl: 2    mupirocin (BACTROBAN) 2 % ointment, Apply 1 Application topically to the appropriate area as directed 3 (Three) Times a Day., Disp: 30 g, Rfl: 0     Allergies     Allergies   Allergen Reactions    Ibuprofen     Naproxen Itching    Penicillins        Social History       Social History     Social History Narrative    Not on file       Immunizations     Immunization:  Immunization History   Administered Date(s) Administered    COVID-19 (PFIZER) Purple Cap Monovalent 04/22/2021, 05/12/2021    Fluzone  >6mos 10/16/2024    Influenza Injectable Mdck Pf Quad 10/13/2020    Influenza, Unspecified 10/13/2020    TD Preservative Free (Tenivac) 07/02/2018          Objective     Objective     Vital Signs:   /84 (BP Location: Left arm, Patient Position: Sitting, Cuff Size: Adult)   Pulse 72   Temp 98.7 °F (37.1 °C) (Temporal)   Ht 193 cm (76\")   Wt 85.3 kg (188 lb)   SpO2 100%   BMI 22.88 kg/m²       Physical Exam  Constitutional:       Appearance: Normal appearance.   HENT:      Nose: Nose normal.      Mouth/Throat:      Mouth: Mucous membranes are moist.   Cardiovascular:      Rate and Rhythm: Normal rate and regular rhythm.      Pulses: Normal pulses.   Pulmonary:      Effort: Pulmonary effort is normal.   Skin:     General: Skin is warm and dry.      Findings: Lesion (left ankle) present.   Neurological:      General: No focal deficit present.      Mental Status: He is alert and oriented to person, place, and time.   Psychiatric:         Mood and Affect: Mood normal.         Behavior: Behavior normal.         Physical Exam  Extremities: Wound on ankle appears improved but not fully healed.      Results    The following data was reviewed by: VANDANA Durant on 06/18/2025:    Common labs          8/6/2024    09:14 4/4/2025    09:40   Common Labs   Glucose 92  84    BUN 14  16    Creatinine 1.08  1.02    Sodium 139  138    Potassium 5.0  5.2    Chloride 102  100    Calcium 10.1  9.5    Albumin " 4.6  4.7    Total Bilirubin 0.9  1.4    Alkaline Phosphatase 59  66    AST (SGOT) 16  23    ALT (SGPT) 12  18    WBC 4.42  6.16    Hemoglobin 15.5  15.9    Hematocrit 47.3  47.9    Platelets 208  235    Total Cholesterol 175  177    Triglycerides 79  70    HDL Cholesterol 47  46    LDL Cholesterol  113  118    Hemoglobin A1C 5.20           Results         Assessment and Plan        Assessment and Plan    Diagnoses and all orders for this visit:    1. Venous stasis ulcer of ankle, left (Primary)  -     mupirocin (BACTROBAN) 2 % ointment; Apply 1 Application topically to the appropriate area as directed 3 (Three) Times a Day.  Dispense: 30 g; Refill: 0    2. Varicose veins of both lower extremities with inflammation  Assessment & Plan:  With ulcer and inflammation      3. Peripheral venous insufficiency  Assessment & Plan:  Continue to wear compression stockings daily   Elevation of extremities   Naproxen 500mg PO bid       4. Primary hypertension  Assessment & Plan:  Hypertension is stable and controlled  Continue current treatment regimen.  Blood pressure will be reassessed in 6 months.          Assessment & Plan  1. Ankle wound.  - The wound on the ankle has shown improvement but has not yet fully healed.  - Increased pain and limited mobility.  - Discussed home management with Aquaphor and the potential benefit of mupirocin ointment.  - Prescription for mupirocin ointment issued, to be applied 2-3 times daily, covered with a Band-Aid, and then the stocking. Follow-up scheduled for 06/30/2025 to assess progress. Referral to wound care will be considered if no significant improvement is noted.        Follow Up        Follow Up   Return in about 2 weeks (around 7/2/2025), or if symptoms worsen or fail to improve.  Patient was given instructions and counseling regarding his condition or for health maintenance advice. Please see specific information pulled into the AVS if appropriate.

## 2025-06-24 ENCOUNTER — HOSPITAL ENCOUNTER (EMERGENCY)
Facility: HOSPITAL | Age: 40
Discharge: HOME | End: 2025-06-24
Attending: EMERGENCY MEDICINE | Admitting: EMERGENCY MEDICINE
Payer: COMMERCIAL

## 2025-06-24 ENCOUNTER — APPOINTMENT (OUTPATIENT)
Dept: GENERAL RADIOLOGY | Facility: HOSPITAL | Age: 40
End: 2025-06-24
Payer: COMMERCIAL

## 2025-06-24 VITALS
HEART RATE: 103 BPM | OXYGEN SATURATION: 98 % | RESPIRATION RATE: 18 BRPM | DIASTOLIC BLOOD PRESSURE: 87 MMHG | SYSTOLIC BLOOD PRESSURE: 142 MMHG | TEMPERATURE: 97.6 F | WEIGHT: 194 LBS | BODY MASS INDEX: 23.62 KG/M2 | HEIGHT: 76 IN

## 2025-06-24 DIAGNOSIS — R07.9 CHEST PAIN, UNSPECIFIED TYPE: Primary | ICD-10-CM

## 2025-06-24 LAB
ALBUMIN SERPL-MCNC: 4.7 G/DL (ref 3.5–5.2)
ALBUMIN/GLOB SERPL: 2.6 G/DL
ALP SERPL-CCNC: 65 U/L (ref 39–117)
ALT SERPL W P-5'-P-CCNC: 18 U/L (ref 1–41)
ANION GAP SERPL CALCULATED.3IONS-SCNC: 11 MMOL/L (ref 5–15)
AST SERPL-CCNC: 21 U/L (ref 1–40)
BASOPHILS # BLD AUTO: 0.05 10*3/MM3 (ref 0–0.2)
BASOPHILS NFR BLD AUTO: 1 % (ref 0–1.5)
BILIRUB SERPL-MCNC: 0.8 MG/DL (ref 0–1.2)
BUN SERPL-MCNC: 16.8 MG/DL (ref 6–20)
BUN/CREAT SERPL: 18.3 (ref 7–25)
CALCIUM SPEC-SCNC: 9 MG/DL (ref 8.6–10.5)
CHLORIDE SERPL-SCNC: 101 MMOL/L (ref 98–107)
CO2 SERPL-SCNC: 24 MMOL/L (ref 22–29)
CREAT SERPL-MCNC: 0.92 MG/DL (ref 0.76–1.27)
DEPRECATED RDW RBC AUTO: 47.4 FL (ref 37–54)
EGFRCR SERPLBLD CKD-EPI 2021: 107.2 ML/MIN/1.73
EOSINOPHIL # BLD AUTO: 0.06 10*3/MM3 (ref 0–0.4)
EOSINOPHIL NFR BLD AUTO: 1.2 % (ref 0.3–6.2)
ERYTHROCYTE [DISTWIDTH] IN BLOOD BY AUTOMATED COUNT: 13.3 % (ref 12.3–15.4)
GEN 5 1HR TROPONIN T REFLEX: 13 NG/L
GLOBULIN UR ELPH-MCNC: 1.8 GM/DL
GLUCOSE SERPL-MCNC: 86 MG/DL (ref 65–99)
HCT VFR BLD AUTO: 39.7 % (ref 37.5–51)
HGB BLD-MCNC: 13.1 G/DL (ref 13–17.7)
HOLD SPECIMEN: NORMAL
HOLD SPECIMEN: NORMAL
IMM GRANULOCYTES # BLD AUTO: 0 10*3/MM3 (ref 0–0.05)
IMM GRANULOCYTES NFR BLD AUTO: 0 % (ref 0–0.5)
LIPASE SERPL-CCNC: 18 U/L (ref 13–60)
LYMPHOCYTES # BLD AUTO: 1.81 10*3/MM3 (ref 0.7–3.1)
LYMPHOCYTES NFR BLD AUTO: 37.4 % (ref 19.6–45.3)
MAGNESIUM SERPL-MCNC: 2.2 MG/DL (ref 1.6–2.6)
MCH RBC QN AUTO: 32 PG (ref 26.6–33)
MCHC RBC AUTO-ENTMCNC: 33 G/DL (ref 31.5–35.7)
MCV RBC AUTO: 97.1 FL (ref 79–97)
MONOCYTES # BLD AUTO: 0.33 10*3/MM3 (ref 0.1–0.9)
MONOCYTES NFR BLD AUTO: 6.8 % (ref 5–12)
NEUTROPHILS NFR BLD AUTO: 2.59 10*3/MM3 (ref 1.7–7)
NEUTROPHILS NFR BLD AUTO: 53.6 % (ref 42.7–76)
NRBC BLD AUTO-RTO: 0 /100 WBC (ref 0–0.2)
NT-PROBNP SERPL-MCNC: 174.7 PG/ML (ref 0–450)
PLATELET # BLD AUTO: 182 10*3/MM3 (ref 140–450)
PMV BLD AUTO: 9.3 FL (ref 6–12)
POTASSIUM SERPL-SCNC: 3.9 MMOL/L (ref 3.5–5.2)
PROT SERPL-MCNC: 6.5 G/DL (ref 6–8.5)
QT INTERVAL: 438 MS
QTC INTERVAL: 420 MS
RBC # BLD AUTO: 4.09 10*6/MM3 (ref 4.14–5.8)
SODIUM SERPL-SCNC: 136 MMOL/L (ref 136–145)
TROPONIN T NUMERIC DELTA: -2 NG/L
TROPONIN T SERPL HS-MCNC: 15 NG/L
WBC NRBC COR # BLD AUTO: 4.84 10*3/MM3 (ref 3.4–10.8)
WHOLE BLOOD HOLD COAG: NORMAL
WHOLE BLOOD HOLD SPECIMEN: NORMAL

## 2025-06-24 PROCEDURE — 83880 ASSAY OF NATRIURETIC PEPTIDE: CPT | Performed by: EMERGENCY MEDICINE

## 2025-06-24 PROCEDURE — 85025 COMPLETE CBC W/AUTO DIFF WBC: CPT | Performed by: EMERGENCY MEDICINE

## 2025-06-24 PROCEDURE — 93005 ELECTROCARDIOGRAM TRACING: CPT | Performed by: EMERGENCY MEDICINE

## 2025-06-24 PROCEDURE — 99284 EMERGENCY DEPT VISIT MOD MDM: CPT

## 2025-06-24 PROCEDURE — 93010 ELECTROCARDIOGRAM REPORT: CPT | Performed by: INTERNAL MEDICINE

## 2025-06-24 PROCEDURE — 84484 ASSAY OF TROPONIN QUANT: CPT | Performed by: EMERGENCY MEDICINE

## 2025-06-24 PROCEDURE — 80053 COMPREHEN METABOLIC PANEL: CPT | Performed by: EMERGENCY MEDICINE

## 2025-06-24 PROCEDURE — 36415 COLL VENOUS BLD VENIPUNCTURE: CPT

## 2025-06-24 PROCEDURE — 93005 ELECTROCARDIOGRAM TRACING: CPT

## 2025-06-24 PROCEDURE — 83735 ASSAY OF MAGNESIUM: CPT | Performed by: EMERGENCY MEDICINE

## 2025-06-24 PROCEDURE — 83690 ASSAY OF LIPASE: CPT | Performed by: EMERGENCY MEDICINE

## 2025-06-24 PROCEDURE — 71045 X-RAY EXAM CHEST 1 VIEW: CPT

## 2025-06-24 RX ORDER — SODIUM CHLORIDE 0.9 % (FLUSH) 0.9 %
10 SYRINGE (ML) INJECTION AS NEEDED
Status: DISCONTINUED | OUTPATIENT
Start: 2025-06-24 | End: 2025-06-24 | Stop reason: HOSPADM

## 2025-06-24 RX ORDER — ASPIRIN 81 MG/1
324 TABLET, CHEWABLE ORAL ONCE
Status: DISCONTINUED | OUTPATIENT
Start: 2025-06-24 | End: 2025-06-24 | Stop reason: HOSPADM

## 2025-06-24 NOTE — ED NOTES
Pt stated he had CP started this morning at work around 0800. Pain started in the middle of his chest, constant and tight that last for about 1 hour after rest. Pt endorsed SOB at that time but denies any N/V/D. Pt took 650mg of Leonard ASA while at work. Pt denies any pain at this time. No distress noted. Skins are normal warm and dry. VSS. Will continue to monitor for changes. CP

## 2025-06-24 NOTE — ED PROVIDER NOTES
Time: 11:48 AM EDT  Date of encounter:  6/24/2025  Independent Historian/Clinical History and Information was obtained by:   Patient    History is limited by: N/A    Chief Complaint: Chest pain      History of Present Illness:  Patient is a 40 y.o. year old male who presents to the emergency department for evaluation of chest pain.  This patient presents to the emergency department stating that he had approximate 1 hour of central chest pain which started earlier today.  Describes a aching.  He has had no fever chills or shortness of breath.  He denies nausea vomiting or diarrhea and denies any abdominal pain.  The patient denies any cough.  He states his blood pressure was elevated at work.      Patient Care Team  Primary Care Provider: Urbano Cabrera DO    Past Medical History:     Allergies   Allergen Reactions    Ibuprofen     Naproxen Itching    Penicillins      Past Medical History:   Diagnosis Date    Anxiety     Depression     Peripheral venous insufficiency     bilateral superficial and deep reflux       Thrombophlebitis     superficial veins of lower leg - LEFT thigh       Tobacco dependence syndrome     Varicose veins of lower extremity     with ulcer AND inflammation - bilateral       Varicose veins of unspecified lower extremities with other complications     compression stocking     Past Surgical History:   Procedure Laterality Date    ELBOW PROCEDURE Right     bone spur removal     Family History   Problem Relation Age of Onset    Depression Mother     Hypertension Father     Dementia Maternal Grandmother     Heart disease Paternal Grandfather     Depression Other     Hypertension Other        Home Medications:  Prior to Admission medications    Medication Sig Start Date End Date Taking? Authorizing Provider   aspirin-acetaminophen-caffeine (EXCEDRIN MIGRAINE) 250-250-65 MG per tablet Take 1 tablet by mouth Every 6 (Six) Hours As Needed for Headache.    Provider, MD Shanika   losartan (COZAAR) 50  "MG tablet Take 1 tablet by mouth 2 (Two) Times a Day. 4/28/25   Urbano Cabrera DO   mupirocin (BACTROBAN) 2 % ointment Apply 1 Application topically to the appropriate area as directed 3 (Three) Times a Day. 6/18/25   Agnieszka Jimenes APRN        Social History:   Social History     Tobacco Use    Smoking status: Every Day     Current packs/day: 0.25     Average packs/day: 1 pack/day for 21.3 years (20.3 ttl pk-yrs)     Types: Cigarettes     Start date: 1/1/2000     Last attempt to quit: 1/1/2020     Passive exposure: Never    Smokeless tobacco: Never    Tobacco comments:     Smokes when he gets stressed, less than half a pack a week   Vaping Use    Vaping status: Former    Substances: THC    Devices: Pre-filled or refillable cartridge, Pre-filled pod   Substance Use Topics    Alcohol use: Not Currently     Alcohol/week: 3.0 standard drinks of alcohol     Types: 3 Cans of beer per week    Drug use: Not Currently     Types: Marijuana         Review of Systems:  Review of Systems   Constitutional:  Negative for chills and fever.   HENT:  Negative for congestion, ear pain and sore throat.    Eyes:  Negative for pain.   Respiratory:  Negative for cough, chest tightness and shortness of breath.    Cardiovascular:  Positive for chest pain.   Gastrointestinal:  Negative for abdominal pain, diarrhea, nausea and vomiting.   Genitourinary:  Negative for flank pain and hematuria.   Musculoskeletal:  Negative for joint swelling.   Skin:  Negative for pallor.   Neurological:  Negative for seizures and headaches.   All other systems reviewed and are negative.       Physical Exam:  /87 (BP Location: Left arm, Patient Position: Sitting)   Pulse 103   Temp 97.6 °F (36.4 °C) (Oral)   Resp 18   Ht 193 cm (76\")   Wt 88 kg (194 lb 0.1 oz)   SpO2 98%   BMI 23.62 kg/m²     Physical Exam  Vitals and nursing note reviewed.   Constitutional:       General: He is not in acute distress.     Appearance: Normal appearance. He is " not toxic-appearing.   HENT:      Head: Normocephalic and atraumatic.      Mouth/Throat:      Mouth: Mucous membranes are moist.   Eyes:      General: No scleral icterus.  Cardiovascular:      Rate and Rhythm: Normal rate and regular rhythm.      Pulses: Normal pulses.      Heart sounds: Normal heart sounds.   Pulmonary:      Effort: Pulmonary effort is normal. No respiratory distress.      Breath sounds: Normal breath sounds.   Abdominal:      General: Abdomen is flat.      Palpations: Abdomen is soft.      Tenderness: There is no abdominal tenderness.   Musculoskeletal:         General: Normal range of motion.      Cervical back: Normal range of motion and neck supple.   Skin:     General: Skin is warm and dry.      Capillary Refill: Capillary refill takes less than 2 seconds.   Neurological:      General: No focal deficit present.      Mental Status: He is alert and oriented to person, place, and time. Mental status is at baseline.   Psychiatric:         Mood and Affect: Mood normal.         Behavior: Behavior normal.                    Medical Decision Making:      Comorbidities that affect care:    Hypertension    External Notes reviewed:    Previous Clinic Note: Office visit for varicose veins      The following orders were placed and all results were independently analyzed by me:  Orders Placed This Encounter   Procedures    XR Chest 1 View    [place] Draw    High Sensitivity Troponin T    Comprehensive Metabolic Panel    Lipase    BNP    Magnesium    CBC Auto Differential    High Sensitivity Troponin T 1Hr    Undress & Gown    Continuous Pulse Oximetry    ECG 12 Lead Chest Pain    CBC & Differential    Green Top (Gel)    Lavender Top    Gold Top - SST    Light Blue Top       Medications Given in the Emergency Department:  Medications - No data to display       ED Course:         EKG: Sinus rhythm rate of 55 bpm  No acute ischemic changes were noted.      Labs:    Lab Results (last 24 hours)       Procedure  Component Value Units Date/Time    High Sensitivity Troponin T [[id number]]  (Normal) Collected: 06/24/25 1036    Specimen: Blood Updated: 06/24/25 1108     HS Troponin T 15 ng/L     Narrative:      High Sensitive Troponin T Reference Range:  <14.0 ng/L- Negative Female for AMI  <22.0 ng/L- Negative Male for AMI  >=14 - Abnormal Female indicating possible myocardial injury.  >=22 - Abnormal Male indicating possible myocardial injury.   Clinicians would have to utilize clinical acumen, EKG, Troponin, and serial changes to determine if it is an Acute Myocardial Infarction or myocardial injury due to an underlying chronic condition.         CBC & Differential [[id number]]  (Abnormal) Collected: 06/24/25 1036    Specimen: Blood Updated: 06/24/25 1048    Narrative:      The following orders were created for panel order CBC & Differential.  Procedure                               Abnormality         Status                     ---------                               -----------         ------                     CBC Auto Differential[[id number]]        Abnormal            Final result                 Please view results for these tests on the individual orders.    Comprehensive Metabolic Panel [[id number]] Collected: 06/24/25 1036    Specimen: Blood Updated: 06/24/25 1108     Glucose 86 mg/dL      BUN 16.8 mg/dL      Creatinine 0.92 mg/dL      Sodium 136 mmol/L      Potassium 3.9 mmol/L      Chloride 101 mmol/L      CO2 24.0 mmol/L      Calcium 9.0 mg/dL      Total Protein 6.5 g/dL      Albumin 4.7 g/dL      ALT (SGPT) 18 U/L      AST (SGOT) 21 U/L      Alkaline Phosphatase 65 U/L      Total Bilirubin 0.8 mg/dL      Globulin 1.8 gm/dL      A/G Ratio 2.6 g/dL      BUN/Creatinine Ratio 18.3     Anion Gap 11.0 mmol/L      eGFR 107.2 mL/min/1.73     Narrative:      GFR Categories in Chronic Kidney Disease (CKD)              GFR Category          GFR (mL/min/1.73)    Interpretation  G1                    90 or greater        Normal  or high (1)  G2                    60-89                Mild decrease (1)  G3a                   45-59                Mild to moderate decrease  G3b                   30-44                Moderate to severe decrease  G4                    15-29                Severe decrease  G5                    14 or less           Kidney failure    (1)In the absence of evidence of kidney disease, neither GFR category G1 or G2 fulfill the criteria for CKD.    eGFR calculation 2021 CKD-EPI creatinine equation, which does not include race as a factor    Lipase [[id number]]  (Normal) Collected: 06/24/25 1036    Specimen: Blood Updated: 06/24/25 1108     Lipase 18 U/L     BNP [[id number]]  (Normal) Collected: 06/24/25 1036    Specimen: Blood Updated: 06/24/25 1106     proBNP 174.7 pg/mL     Narrative:      This assay is used as an aid in the diagnosis of individuals suspected of having heart failure. It can be used as an aid in the diagnosis of acute decompensated heart failure (ADHF) in patients presenting with signs and symptoms of ADHF to the emergency department (ED). In addition, NT-proBNP of <300 pg/mL indicates ADHF is not likely.    Age Range Result Interpretation  NT-proBNP Concentration (pg/mL:      <50             Positive            >450                   Gray                 300-450                    Negative             <300    50-75           Positive            >900                  Gray                300-900                  Negative            <300      >75             Positive            >1800                  Gray                300-1800                  Negative            <300    Magnesium [[id number]]  (Normal) Collected: 06/24/25 1036    Specimen: Blood Updated: 06/24/25 1108     Magnesium 2.2 mg/dL     CBC Auto Differential [[id number]]  (Abnormal) Collected: 06/24/25 1036    Specimen: Blood Updated: 06/24/25 1048     WBC 4.84 10*3/mm3      RBC 4.09 10*6/mm3      Hemoglobin 13.1 g/dL      Hematocrit 39.7 %       MCV 97.1 fL      MCH 32.0 pg      MCHC 33.0 g/dL      RDW 13.3 %      RDW-SD 47.4 fl      MPV 9.3 fL      Platelets 182 10*3/mm3      Neutrophil % 53.6 %      Lymphocyte % 37.4 %      Monocyte % 6.8 %      Eosinophil % 1.2 %      Basophil % 1.0 %      Immature Grans % 0.0 %      Neutrophils, Absolute 2.59 10*3/mm3      Lymphocytes, Absolute 1.81 10*3/mm3      Monocytes, Absolute 0.33 10*3/mm3      Eosinophils, Absolute 0.06 10*3/mm3      Basophils, Absolute 0.05 10*3/mm3      Immature Grans, Absolute 0.00 10*3/mm3      nRBC 0.0 /100 WBC     High Sensitivity Troponin T 1Hr [[id number]]  (Normal) Collected: 06/24/25 1147    Specimen: Blood Updated: 06/24/25 1229     HS Troponin T 13 ng/L      Troponin T Numeric Delta -2 ng/L     Narrative:      High Sensitive Troponin T Reference Range:  <14.0 ng/L- Negative Female for AMI  <22.0 ng/L- Negative Male for AMI  >=14 - Abnormal Female indicating possible myocardial injury.  >=22 - Abnormal Male indicating possible myocardial injury.   Clinicians would have to utilize clinical acumen, EKG, Troponin, and serial changes to determine if it is an Acute Myocardial Infarction or myocardial injury due to an underlying chronic condition.                  Imaging:    XR Chest 1 View  Result Date: 6/24/2025  XR CHEST 1 VW Date of Exam: 6/24/2025 11:13 AM EDT Indication: Chest Pain Triage Protocol Comparison: None available. Findings: Cardiovascular configuration the chest appears normal and the lungs are clear.     Impression: No active disease. Electronically Signed: Rubio Rosa MD  6/24/2025 11:35 AM EDT  Workstation ID: [id number]        Differential Diagnosis and Discussion:    Chest Pain:  Based on the patient's signs and symptoms, I considered aortic dissection, myocardial infaction, pulmonary embolism, cardiac tamponade, pericarditis, pneumothorax, musculoskeletal chest pain and other differential diagnosis as an etiology of the patient's chest pain.     PROCEDURES:    Labs  were collected in the emergency department and all labs were reviewed and interpreted by me.  X-ray were performed in the emergency department and all X-ray impressions were independently interpreted by me.  An EKG was performed and the EKG was interpreted by me.    ECG 12 Lead Chest Pain   Preliminary Result   HEART RATE=55  bpm   RR Zddfsrbn=2220  ms   WI Vkicxzjq=724  ms   P Horizontal Axis=9  deg   P Front Axis=18  deg   QRSD Sgenxlab=635  ms   QT Dvdljyts=254  ms   AAuA=574  ms   QRS Axis=29  deg   T Wave Axis=10  deg   - OTHERWISE NORMAL ECG -   Sinus rhythm   RSR' in V1 or V2, probably normal variant   Date and Time of Study:2025-06-24 10:25:23          Procedures    MDM     Amount and/or Complexity of Data Reviewed  Clinical lab tests: reviewed  Tests in the radiology section of CPT®: reviewed  Tests in the medicine section of CPT®: reviewed                       Patient Care Considerations:    CT CHEST: I considered ordering a CT scan of the chest, however the patient is currently symptom-free and has no signs of pulmonary embolism      Consultants/Shared Management Plan:    None    Social Determinants of Health:    Patient has presented with family members who are responsible, reliable and will ensure follow up care.      Disposition and Care Coordination:    Discharged: I considered escalation of care by admitting this patient to the hospital, however the patient has a low risk heart score    I have explained discharge medications and the need for follow up with the patient/caretakers. This was also printed in the discharge instructions. Patient was discharged with the following medications and follow up:      Medication List      No changes were made to your prescriptions during this visit.      Urbano Cabrera  [address] [place] [id number] [phone]    In 1 day         Final diagnoses:   Chest pain, unspecified type        ED Disposition       ED Disposition   Discharge     Condition   Stable    Comment   --               This medical record created using voice recognition software.             Nnamdi Miller, DO  06/24/25 [id number]

## 2025-06-24 NOTE — DISCHARGE INSTRUCTIONS
Do not smoke or vape.  Take your medicines as directed.  Return if symptoms return or persist.  Follow-up with your primary care provider tomorrow

## 2025-06-24 NOTE — Clinical Note
[facility] EMERGENCY ROOM  [address] [place] [address]  Phone: [phone]  Fax: [phone]    Tenzin Eastman was seen and treated in our emergency department on 6/24/2025.  He may return to work on 06/26/2025.         Thank you for choosing [facility].    Nnamdi Miller, DO

## 2025-06-30 ENCOUNTER — OFFICE VISIT (OUTPATIENT)
Dept: FAMILY MEDICINE CLINIC | Facility: CLINIC | Age: 40
End: 2025-06-30
Payer: COMMERCIAL

## 2025-06-30 VITALS
SYSTOLIC BLOOD PRESSURE: 133 MMHG | HEART RATE: 74 BPM | OXYGEN SATURATION: 99 % | WEIGHT: 189 LBS | HEIGHT: 76 IN | TEMPERATURE: 98.7 F | BODY MASS INDEX: 23.02 KG/M2 | DIASTOLIC BLOOD PRESSURE: 78 MMHG

## 2025-06-30 DIAGNOSIS — I87.2 PERIPHERAL VENOUS INSUFFICIENCY: Chronic | ICD-10-CM

## 2025-06-30 DIAGNOSIS — I83.12 VARICOSE VEINS OF BOTH LOWER EXTREMITIES WITH INFLAMMATION: Chronic | ICD-10-CM

## 2025-06-30 DIAGNOSIS — I83.11 VARICOSE VEINS OF BOTH LOWER EXTREMITIES WITH INFLAMMATION: Chronic | ICD-10-CM

## 2025-06-30 DIAGNOSIS — I83.023 VENOUS STASIS ULCER OF ANKLE, LEFT: Primary | Chronic | ICD-10-CM

## 2025-06-30 DIAGNOSIS — L97.329 VENOUS STASIS ULCER OF ANKLE, LEFT: Primary | Chronic | ICD-10-CM

## 2025-06-30 RX ORDER — MUPIROCIN 2 %
1 OINTMENT (GRAM) TOPICAL 3 TIMES DAILY
Qty: 30 G | Refills: 1 | Status: SHIPPED | OUTPATIENT
Start: 2025-06-30

## 2025-06-30 NOTE — PROGRESS NOTES
Chief Complaint     Ulcer of the ankle  (Pt states ointment seems to be helping )    Patient or patient representative verbalized consent for the use of Ambient Listening during the visit with  VANDANA Durant for chart documentation. 7/9/2025  15:19 EDT    History of Present Illness     Tenzin Eastman is a 40 y.o. male who presents to [facility] .    History of Present Illness  The patient presents for evaluation of an ankle wound.    He reports that the ointment prescribed for her ankle wound has been beneficial, and he still has a sufficient supply. However, he experiences intermittent pain, predominantly when standing still. His daily routine involves extensive walking, often exceeding 20,000 steps, which does not seem to exacerbate the discomfort. The pain is also absent during physical exercise. Occasionally, he experiences pain while sitting or lying down, but he is able to alleviate it by adjusting her position at home.         History      Past Medical History:   Diagnosis Date    Anxiety     Depression     Peripheral venous insufficiency     bilateral superficial and deep reflux       Thrombophlebitis     superficial veins of lower leg - LEFT thigh       Tobacco dependence syndrome     Varicose veins of lower extremity     with ulcer AND inflammation - bilateral       Varicose veins of unspecified lower extremities with other complications     compression stocking       Past Surgical History:   Procedure Laterality Date    ELBOW PROCEDURE Right     bone spur removal       Family History   Problem Relation Age of Onset    Depression Mother     Hypertension Father     Dementia Maternal Grandmother     Heart disease Paternal Grandfather     Depression Other     Hypertension Other         Current Medications        Current Outpatient Medications:     aspirin-acetaminophen-caffeine (EXCEDRIN MIGRAINE) 250-250-65 MG per tablet, Take 1 tablet by mouth Every 6 (Six)  "Hours As Needed for Headache., Disp: , Rfl:     ADONIS ASPIRIN PO, Take 650 mg by mouth Daily., Disp: , Rfl:     losartan (COZAAR) 50 MG tablet, Take 1 tablet by mouth 2 (Two) Times a Day., Disp: 60 tablet, Rfl: 2    mupirocin (BACTROBAN) 2 % ointment, Apply 1 Application topically to the appropriate area as directed 3 (Three) Times a Day., Disp: 30 g, Rfl: 1     Allergies     Allergies   Allergen Reactions    Ibuprofen     Naproxen Itching    Penicillins        Social History       Social History     Social History Narrative    Not on file       Immunizations     Immunization:  Immunization History   Administered Date(s) Administered    COVID-19 (PFIZER) Purple Cap Monovalent 04/22/2021, 05/12/2021    Fluzone  >6mos 10/16/2024    Influenza Injectable Mdck Pf Quad 10/13/2020    Influenza, Unspecified 10/13/2020    TD Preservative Free (Tenivac) 07/02/2018          Objective     Objective     Vital Signs:   /78 (BP Location: Left arm, Patient Position: Sitting, Cuff Size: Adult)   Pulse 74   Temp 98.7 °F (37.1 °C) (Temporal)   Ht 193 cm (76\")   Wt 85.7 kg (189 lb)   SpO2 99%   BMI 23.01 kg/m²       Physical Exam  Constitutional:       Appearance: Normal appearance.   HENT:      Nose: Nose normal.      Mouth/Throat:      Mouth: Mucous membranes are moist.   Cardiovascular:      Rate and Rhythm: Normal rate and regular rhythm.      Pulses: Normal pulses.      Heart sounds: Normal heart sounds.   Pulmonary:      Effort: Pulmonary effort is normal.      Breath sounds: Normal breath sounds.   Skin:     General: Skin is warm and dry.   Neurological:      General: No focal deficit present.      Mental Status: He is alert and oriented to person, place, and time.   Psychiatric:         Mood and Affect: Mood normal.         Behavior: Behavior normal.         Physical Exam  Extremities: Improvement noted in the condition of the ankle.      Results    The following data was reviewed by: VANDANA Durant on " 06/30/2025:          Results         Assessment and Plan        Assessment and Plan    Diagnoses and all orders for this visit:    1. Venous stasis ulcer of ankle, left (Primary)  -     mupirocin (BACTROBAN) 2 % ointment; Apply 1 Application topically to the appropriate area as directed 3 (Three) Times a Day.  Dispense: 30 g; Refill: 1    2. Varicose veins of both lower extremities with inflammation  Assessment & Plan:  With ulcer and inflammation      3. Peripheral venous insufficiency  Assessment & Plan:  Continue to wear compression stockings daily   Elevation of extremities   Naproxen 500mg PO bid           Assessment & Plan  1. Ankle wound.  - The ointment has been effective in promoting healing.  - Occasional pain reported, primarily when standing still, but not during walking or exercising. Pain sometimes occurs when sitting or lying down, but can be managed by adjusting position.  - Advised to continue using the ointment until the wound is fully healed and to apply it to any new open areas.  - A refill for the ointment has been sent to pharmacy. Encouraged to maintain mobility as much as possible. If the wound does not heal within a month, a referral to wound care will be considered.        Follow Up        Follow Up   No follow-ups on file.  Patient was given instructions and counseling regarding his condition or for health maintenance advice. Please see specific information pulled into the AVS if appropriate.    Answers submitted by the patient for this visit:  Chronic Condition Follow-up (Submitted on 6/28/2025)  Chief Complaint: PCP follow-up  other: Yes  Medication compliance: all of the time  Treatment barriers: no complaince problems  Exercise: daily

## 2025-07-29 DIAGNOSIS — I10 PRIMARY HYPERTENSION: ICD-10-CM

## 2025-07-29 RX ORDER — LOSARTAN POTASSIUM 50 MG/1
50 TABLET ORAL 2 TIMES DAILY
Qty: 60 TABLET | Refills: 2 | Status: SHIPPED | OUTPATIENT
Start: 2025-07-29

## 2025-07-31 ENCOUNTER — OFFICE VISIT (OUTPATIENT)
Dept: FAMILY MEDICINE CLINIC | Facility: CLINIC | Age: 40
End: 2025-07-31
Payer: COMMERCIAL

## 2025-07-31 VITALS
SYSTOLIC BLOOD PRESSURE: 124 MMHG | WEIGHT: 199 LBS | HEIGHT: 76 IN | DIASTOLIC BLOOD PRESSURE: 72 MMHG | TEMPERATURE: 98.6 F | OXYGEN SATURATION: 98 % | BODY MASS INDEX: 24.23 KG/M2 | HEART RATE: 64 BPM

## 2025-07-31 DIAGNOSIS — L97.329 VENOUS STASIS ULCER OF ANKLE, LEFT: ICD-10-CM

## 2025-07-31 DIAGNOSIS — I83.12 VARICOSE VEINS OF BOTH LOWER EXTREMITIES WITH INFLAMMATION: Primary | ICD-10-CM

## 2025-07-31 DIAGNOSIS — I83.023 VENOUS STASIS ULCER OF ANKLE, LEFT: ICD-10-CM

## 2025-07-31 DIAGNOSIS — I87.2 PERIPHERAL VENOUS INSUFFICIENCY: ICD-10-CM

## 2025-07-31 DIAGNOSIS — I83.11 VARICOSE VEINS OF BOTH LOWER EXTREMITIES WITH INFLAMMATION: Primary | ICD-10-CM

## 2025-07-31 NOTE — PROGRESS NOTES
Chief Complaint     VENOUD STASIS ULCER OF ANKLE, LEFT     Patient or patient representative verbalized consent for the use of Ambient Listening during the visit with  VANDANA Durant for chart documentation. 8/1/2025  15:41 EDT    History of Present Illness     Tenzin Eastman is a 40 y.o. male who presents to [facility] .    History of Present Illness  The patient presents for a spot on his skin.    He reports an improvement in the condition of the spot, with a decrease in associated pain. He has been applying mupirocin once daily as part of his treatment regimen. He is not currently under the care of a vascular specialist and does not express a desire to consult one at this time.         History      Past Medical History:   Diagnosis Date    Anxiety     Depression     Peripheral venous insufficiency     bilateral superficial and deep reflux       Thrombophlebitis     superficial veins of lower leg - LEFT thigh       Tobacco dependence syndrome     Varicose veins of lower extremity     with ulcer AND inflammation - bilateral       Varicose veins of unspecified lower extremities with other complications     compression stocking       Past Surgical History:   Procedure Laterality Date    ELBOW PROCEDURE Right     bone spur removal       Family History   Problem Relation Age of Onset    Depression Mother     Hypertension Father     Dementia Maternal Grandmother     Heart disease Paternal Grandfather     Depression Other     Hypertension Other         Current Medications        Current Outpatient Medications:     aspirin-acetaminophen-caffeine (EXCEDRIN MIGRAINE) 250-250-65 MG per tablet, Take 1 tablet by mouth Every 6 (Six) Hours As Needed for Headache., Disp: , Rfl:     ADONIS ASPIRIN PO, Take 650 mg by mouth Daily., Disp: , Rfl:     losartan (COZAAR) 50 MG tablet, TAKE 1 TABLET BY MOUTH TWICE DAILY, Disp: 60 tablet, Rfl: 2    mupirocin (BACTROBAN) 2 % ointment, Apply 1  "Application topically to the appropriate area as directed 3 (Three) Times a Day., Disp: 30 g, Rfl: 1     Allergies     Allergies   Allergen Reactions    Ibuprofen     Naproxen Itching    Penicillins        Social History       Social History     Social History Narrative    Not on file       Immunizations     Immunization:  Immunization History   Administered Date(s) Administered    COVID-19 (PFIZER) Purple Cap Monovalent 04/22/2021, 05/12/2021    Fluzone  >6mos 10/16/2024    Influenza Injectable Mdck Pf Quad 10/13/2020    Influenza, Unspecified 10/13/2020    TD Preservative Free (Tenivac) 07/02/2018          Objective     Objective     Vital Signs:   /72 (BP Location: Left arm, Patient Position: Sitting, Cuff Size: Adult)   Pulse 64   Temp 98.6 °F (37 °C) (Temporal)   Ht 193 cm (76\")   Wt 90.3 kg (199 lb)   SpO2 98%   BMI 24.22 kg/m²       Physical Exam  Constitutional:       Appearance: Normal appearance.   HENT:      Nose: Nose normal.      Mouth/Throat:      Mouth: Mucous membranes are moist.   Cardiovascular:      Rate and Rhythm: Normal rate and regular rhythm.   Pulmonary:      Effort: Pulmonary effort is normal.      Breath sounds: Normal breath sounds.   Skin:     General: Skin is warm and dry.      Findings: Lesion (improving with tx) present.   Neurological:      General: No focal deficit present.      Mental Status: He is alert and oriented to person, place, and time.   Psychiatric:         Mood and Affect: Mood normal.         Behavior: Behavior normal.         Physical Exam  Skin: Healing lesion noted, almost healed      Results    The following data was reviewed by: VANDANA Durant on 07/31/2025:          Results         Assessment and Plan        Assessment and Plan    Diagnoses and all orders for this visit:    1. Varicose veins of both lower extremities with inflammation (Primary)  Comments:  F/U if wanting vascular referral    2. Peripheral venous insufficiency    3. Venous " stasis ulcer of ankle, left  Comments:  Continue on current treatment regime.        Assessment & Plan  1. Skin lesion.  - The skin lesion is showing signs of improvement, with the patient reporting less pain and better appearance.  - Physical examination indicates the lesion is almost healed.  - Continued use of mupirocin once a day was advised, and the patient was counseled on the potential need for a vascular specialist if another lesion develops or if healing does not progress.  - The patient has a refill of mupirocin and will continue the current treatment regimen.    Follow-up: The patient will follow up in 1 month.        Follow Up        Follow Up   Return in about 4 weeks (around 8/28/2025), or if symptoms worsen or fail to improve.  Patient was given instructions and counseling regarding his condition or for health maintenance advice. Please see specific information pulled into the AVS if appropriate.